# Patient Record
Sex: FEMALE | Race: WHITE | NOT HISPANIC OR LATINO | Employment: OTHER | URBAN - METROPOLITAN AREA
[De-identification: names, ages, dates, MRNs, and addresses within clinical notes are randomized per-mention and may not be internally consistent; named-entity substitution may affect disease eponyms.]

---

## 2017-04-26 ENCOUNTER — TELEPHONE (OUTPATIENT)
Dept: FAMILY MEDICINE | Facility: CLINIC | Age: 62
End: 2017-04-26

## 2017-04-26 NOTE — TELEPHONE ENCOUNTER
Patient would like a letter to be excused from Jury Duty due to Restless leg Syndrome.  Due for Jury Duty June 1st.    Please advise

## 2017-04-26 NOTE — TELEPHONE ENCOUNTER
----- Message from Mitali Boyd sent at 4/25/2017  4:12 PM CDT -----  Contact: Julio (spouse)  Julio (spouse) calling in regards to requesting a Doctor's note for Jury Duty due to the patient not being able to sit for long periods of time. Please advise.  Call back .  Thanks!

## 2017-04-26 NOTE — LETTER
[facility]  [address]  [place] [address]  Phone: [phone]  Fax: [phone] April 28, 2017    Jennifer Hurst  [address] [place] [address]      To Whom It May Concern:    Jennifer Hurst is unable to participate in jury duty due to the fact that she has restless leg syndrome and it is difficult for her to sit in one spot for any extended period of time.  She does require being able to get up and move around periodically for this condition.    If you have any questions or concerns, please feel free to call my office.    Sincerely,        Govind Feng Jr., MD

## 2017-04-29 ENCOUNTER — TELEPHONE (OUTPATIENT)
Dept: FAMILY MEDICINE | Facility: CLINIC | Age: 62
End: 2017-04-29

## 2017-04-29 NOTE — TELEPHONE ENCOUNTER
----- Message from Alexandra Dietrich RT sent at 4/29/2017  9:12 AM CDT -----  Contact: Julio () [phone]  Julio () [phone], requesting to check the status of a letter in [facility] jury duty for the pt, please call soon, thanks.

## 2017-11-07 ENCOUNTER — DOCUMENTATION ONLY (OUTPATIENT)
Dept: FAMILY MEDICINE | Facility: CLINIC | Age: 62
End: 2017-11-07

## 2017-11-07 DIAGNOSIS — Z11.59 NEED FOR HEPATITIS C SCREENING TEST: Primary | ICD-10-CM

## 2017-11-07 DIAGNOSIS — Z12.39 SCREENING FOR BREAST CANCER: ICD-10-CM

## 2017-11-07 NOTE — PROGRESS NOTES
Pre-Visit Chart Review  For Appointment Scheduled on (date) 11/9/17    Health Maintenance Due   Topic Date Due    Hepatitis C Screening  1955    Lipid Panel  1955    TETANUS VACCINE  02/03/1971    Zoster Vaccine  02/03/2013    Influenza Vaccine  08/01/2017    Mammogram  10/16/2017

## 2017-11-09 ENCOUNTER — OFFICE VISIT (OUTPATIENT)
Dept: FAMILY MEDICINE | Facility: CLINIC | Age: 62
End: 2017-11-09

## 2017-11-09 VITALS
TEMPERATURE: 98 F | DIASTOLIC BLOOD PRESSURE: 62 MMHG | WEIGHT: 121.69 LBS | RESPIRATION RATE: 16 BRPM | HEART RATE: 77 BPM | HEIGHT: 64 IN | SYSTOLIC BLOOD PRESSURE: 89 MMHG | BODY MASS INDEX: 20.78 KG/M2

## 2017-11-09 DIAGNOSIS — R53.83 OTHER FATIGUE: Primary | ICD-10-CM

## 2017-11-09 PROCEDURE — 99213 OFFICE O/P EST LOW 20 MIN: CPT | Mod: S$PBB,,, | Performed by: FAMILY MEDICINE

## 2017-11-09 PROCEDURE — 99999 PR PBB SHADOW E&M-EST. PATIENT-LVL III: CPT | Mod: PBBFAC,,, | Performed by: FAMILY MEDICINE

## 2017-11-09 PROCEDURE — 99213 OFFICE O/P EST LOW 20 MIN: CPT | Mod: PBBFAC,PO | Performed by: FAMILY MEDICINE

## 2017-11-09 RX ORDER — VIT C/E/ZN/COPPR/LUTEIN/ZEAXAN 250MG-90MG
1000 CAPSULE ORAL DAILY
Refills: 0 | COMMUNITY
Start: 2017-11-09

## 2017-11-09 RX ORDER — FISH OIL/DHA/EPA 1200-144MG
1 CAPSULE ORAL DAILY
COMMUNITY

## 2017-11-13 NOTE — PROGRESS NOTES
Subjective:       Patient ID: Jennifer Hurst is a 62 y.o. female.    Chief Complaint: Fatigue    Patient presents here for complaint of fatigue.  She states that overall she feels well but does like she fatigues a little leisure than she used to.  It has been about 3-4 years since I have seen her.  She is still very active and denies any shortness of breath, chest pain, palpitations, epigastric pain, or nausea or vomiting.  She states she just feels more fatigued at the end of the day.  She is on no prescription medications and only takes over-the-counter vitamins as well as fish oil.  She does eat a healthy diet and her weight is stable.  She also does exercise on a regular basis.  She has no chronic medical problems.  As far as screening, she declines flu vaccine and shingles vaccine.  She has had her Pap smear and mammogram by her gynecologist last month.  She also is due for her lab work at this time.      Review of Systems   Constitutional: Positive for fatigue (mild). Negative for chills, fever and unexpected weight change.   HENT: Negative for congestion, ear pain, postnasal drip and sore throat.    Respiratory: Negative for cough and shortness of breath.    Cardiovascular: Negative for chest pain and palpitations.   Gastrointestinal: Negative for abdominal pain, constipation, diarrhea, nausea and vomiting.   Genitourinary: Negative for difficulty urinating, dysuria, flank pain and pelvic pain.   Musculoskeletal: Negative for arthralgias, back pain and myalgias.   Neurological: Negative for dizziness, light-headedness and headaches.   Psychiatric/Behavioral: Negative for sleep disturbance. The patient is not nervous/anxious.        Objective:      Physical Exam   Constitutional: She is oriented to person, place, and time. She appears well-developed and well-nourished.   HENT:   Head: Normocephalic and atraumatic.   Right Ear: External ear normal.   Left Ear: External ear normal.   Nose: Nose normal.    Mouth/Throat: Oropharynx is clear and moist.   Neck: Normal range of motion. Neck supple. No thyromegaly present.   Cardiovascular: Normal rate, regular rhythm, normal heart sounds and intact distal pulses.    No murmur heard.  Pulmonary/Chest: Effort normal and breath sounds normal. She has no wheezes. She has no rales.   Abdominal: Soft. Bowel sounds are normal. She exhibits no mass. There is no tenderness. There is no rebound.   Musculoskeletal: Normal range of motion. She exhibits no edema or tenderness.   Lymphadenopathy:     She has no cervical adenopathy.   Neurological: She is alert and oriented to person, place, and time. She has normal reflexes. No cranial nerve deficit.   Skin: Skin is warm and dry. No rash noted.   Psychiatric: She has a normal mood and affect. Her behavior is normal.   Vitals reviewed.      Assessment:       1. Other fatigue        Plan:       1.  CMP, lipid profile, TSH  2.  Continue healthy diet and exercise  3.  Follow-up with me in one year or when necessary

## 2017-11-20 ENCOUNTER — TELEPHONE (OUTPATIENT)
Dept: FAMILY MEDICINE | Facility: CLINIC | Age: 62
End: 2017-11-20

## 2017-11-20 NOTE — TELEPHONE ENCOUNTER
----- Message from Isabel Child sent at 11/20/2017  2:40 PM CST -----  Contact: Patient  Patient called inquiring about her lab results. Patient advised they were completed over a week ago at [facility] and has not heard back regarding the results.  Please call patient back at [phone].  Thank you!

## 2017-11-20 NOTE — TELEPHONE ENCOUNTER
[facility]  Results are on your desk for review.

## 2017-12-04 ENCOUNTER — TELEPHONE (OUTPATIENT)
Dept: FAMILY MEDICINE | Facility: CLINIC | Age: 62
End: 2017-12-04

## 2017-12-04 NOTE — TELEPHONE ENCOUNTER
----- Message from Kim Costa sent at 12/4/2017  3:00 PM CST -----  Contact: SELF  Patient is requesting to speak with Dr Feng to discuss about her cholesterol being high. Please call the patient at [phone].

## 2017-12-07 ENCOUNTER — TELEPHONE (OUTPATIENT)
Dept: FAMILY MEDICINE | Facility: CLINIC | Age: 62
End: 2017-12-07

## 2017-12-07 NOTE — TELEPHONE ENCOUNTER
----- Message from Lyndsey Ames sent at 12/7/2017  1:20 PM CST -----  Pt had labs at lab at [facility] 11/09 ... Asking to  copy of results  report today ... Call pt at [phone]

## 2017-12-28 ENCOUNTER — HOSPITAL ENCOUNTER (EMERGENCY)
Facility: HOSPITAL | Age: 62
Discharge: HOME | End: 2017-12-28
Attending: EMERGENCY MEDICINE

## 2017-12-28 VITALS
TEMPERATURE: 99 F | HEART RATE: 86 BPM | DIASTOLIC BLOOD PRESSURE: 55 MMHG | RESPIRATION RATE: 18 BRPM | HEIGHT: 64 IN | OXYGEN SATURATION: 98 % | WEIGHT: 121 LBS | SYSTOLIC BLOOD PRESSURE: 113 MMHG | BODY MASS INDEX: 20.66 KG/M2

## 2017-12-28 DIAGNOSIS — J10.1 INFLUENZA A: ICD-10-CM

## 2017-12-28 DIAGNOSIS — R55 SYNCOPE: ICD-10-CM

## 2017-12-28 DIAGNOSIS — R55 SYNCOPE, UNSPECIFIED SYNCOPE TYPE: ICD-10-CM

## 2017-12-28 DIAGNOSIS — S01.01XA LACERATION OF SCALP, INITIAL ENCOUNTER: Primary | ICD-10-CM

## 2017-12-28 LAB
ALBUMIN SERPL BCP-MCNC: 3.7 G/DL
ALP SERPL-CCNC: 66 U/L
ALT SERPL W/O P-5'-P-CCNC: 18 U/L
ANION GAP SERPL CALC-SCNC: 11 MMOL/L
AST SERPL-CCNC: 18 U/L
BASOPHILS # BLD AUTO: 0 K/UL
BASOPHILS NFR BLD: 0.1 %
BILIRUB SERPL-MCNC: 0.4 MG/DL
BUN SERPL-MCNC: 13 MG/DL
CALCIUM SERPL-MCNC: 9 MG/DL
CHLORIDE SERPL-SCNC: 102 MMOL/L
CO2 SERPL-SCNC: 26 MMOL/L
CREAT SERPL-MCNC: 0.9 MG/DL
DIFFERENTIAL METHOD: ABNORMAL
EOSINOPHIL # BLD AUTO: 0.1 K/UL
EOSINOPHIL NFR BLD: 2.1 %
ERYTHROCYTE [DISTWIDTH] IN BLOOD BY AUTOMATED COUNT: 13.1 %
EST. GFR  (AFRICAN AMERICAN): >60 ML/MIN/1.73 M^2
EST. GFR  (NON AFRICAN AMERICAN): >60 ML/MIN/1.73 M^2
FLUAV AG SPEC QL IA: POSITIVE
FLUBV AG SPEC QL IA: NEGATIVE
GLUCOSE SERPL-MCNC: 107 MG/DL
HCT VFR BLD AUTO: 39.7 %
HGB BLD-MCNC: 13.4 G/DL
LYMPHOCYTES # BLD AUTO: 0.5 K/UL
LYMPHOCYTES NFR BLD: 8.5 %
MCH RBC QN AUTO: 30.7 PG
MCHC RBC AUTO-ENTMCNC: 33.8 G/DL
MCV RBC AUTO: 91 FL
MONOCYTES # BLD AUTO: 0.2 K/UL
MONOCYTES NFR BLD: 4.5 %
NEUTROPHILS # BLD AUTO: 4.7 K/UL
NEUTROPHILS NFR BLD: 84.8 %
PLATELET # BLD AUTO: 164 K/UL
PMV BLD AUTO: 8.6 FL
POTASSIUM SERPL-SCNC: 4 MMOL/L
PROT SERPL-MCNC: 6.9 G/DL
RBC # BLD AUTO: 4.36 M/UL
SODIUM SERPL-SCNC: 139 MMOL/L
SPECIMEN SOURCE: ABNORMAL
WBC # BLD AUTO: 5.5 K/UL

## 2017-12-28 PROCEDURE — 90715 TDAP VACCINE 7 YRS/> IM: CPT | Performed by: EMERGENCY MEDICINE

## 2017-12-28 PROCEDURE — 85025 COMPLETE CBC W/AUTO DIFF WBC: CPT

## 2017-12-28 PROCEDURE — 93005 ELECTROCARDIOGRAM TRACING: CPT

## 2017-12-28 PROCEDURE — 12002 RPR S/N/AX/GEN/TRNK2.6-7.5CM: CPT

## 2017-12-28 PROCEDURE — 80053 COMPREHEN METABOLIC PANEL: CPT

## 2017-12-28 PROCEDURE — 36415 COLL VENOUS BLD VENIPUNCTURE: CPT

## 2017-12-28 PROCEDURE — 90471 IMMUNIZATION ADMIN: CPT | Performed by: EMERGENCY MEDICINE

## 2017-12-28 PROCEDURE — 87400 INFLUENZA A/B EACH AG IA: CPT | Mod: 59

## 2017-12-28 PROCEDURE — 25000003 PHARM REV CODE 250: Performed by: EMERGENCY MEDICINE

## 2017-12-28 PROCEDURE — 96360 HYDRATION IV INFUSION INIT: CPT

## 2017-12-28 PROCEDURE — 63600175 PHARM REV CODE 636 W HCPCS: Performed by: EMERGENCY MEDICINE

## 2017-12-28 PROCEDURE — 99284 EMERGENCY DEPT VISIT MOD MDM: CPT | Mod: 25

## 2017-12-28 RX ORDER — LIDOCAINE HYDROCHLORIDE 10 MG/ML
10 INJECTION INFILTRATION; PERINEURAL
Status: DISCONTINUED | OUTPATIENT
Start: 2017-12-28 | End: 2017-12-28

## 2017-12-28 RX ORDER — OSELTAMIVIR PHOSPHATE 75 MG/1
75 CAPSULE ORAL 2 TIMES DAILY
Qty: 10 CAPSULE | Refills: 0 | Status: SHIPPED | OUTPATIENT
Start: 2017-12-28 | End: 2017-12-28

## 2017-12-28 RX ORDER — LIDOCAINE HYDROCHLORIDE 10 MG/ML
10 INJECTION, SOLUTION EPIDURAL; INFILTRATION; INTRACAUDAL; PERINEURAL
Status: COMPLETED | OUTPATIENT
Start: 2017-12-28 | End: 2017-12-28

## 2017-12-28 RX ORDER — OSELTAMIVIR PHOSPHATE 75 MG/1
75 CAPSULE ORAL 2 TIMES DAILY
Qty: 10 CAPSULE | Refills: 0 | Status: SHIPPED | OUTPATIENT
Start: 2017-12-28 | End: 2018-01-02

## 2017-12-28 RX ADMIN — LIDOCAINE HYDROCHLORIDE 100 MG: 10 INJECTION, SOLUTION EPIDURAL; INFILTRATION; INTRACAUDAL; PERINEURAL at 12:12

## 2017-12-28 RX ADMIN — CLOSTRIDIUM TETANI TOXOID ANTIGEN (FORMALDEHYDE INACTIVATED), CORYNEBACTERIUM DIPHTHERIAE TOXOID ANTIGEN (FORMALDEHYDE INACTIVATED), BORDETELLA PERTUSSIS TOXOID ANTIGEN (GLUTARALDEHYDE INACTIVATED), BORDETELLA PERTUSSIS FILAMENTOUS HEMAGGLUTININ ANTIGEN (FORMALDEHYDE INACTIVATED), BORDETELLA PERTUSSIS PERTACTIN ANTIGEN, AND BORDETELLA PERTUSSIS FIMBRIAE 2/3 ANTIGEN 0.5 ML: 5; 2; 2.5; 5; 3; 5 INJECTION, SUSPENSION INTRAMUSCULAR at 12:12

## 2017-12-28 RX ADMIN — SODIUM CHLORIDE 1000 ML: 0.9 INJECTION, SOLUTION INTRAVENOUS at 11:12

## 2017-12-28 NOTE — ED PROVIDER NOTES
Encounter Date: 12/28/2017    SCRIBE #1 NOTE: I, Sherry Sierra, am scribing for, and in the presence of, Dr. Ferraro.       History     Chief Complaint   Patient presents with    Loss of Consciousness    Head Injury     laceration     Fatigue     recent flu symptoms        12/28/2017 10:33 AM     Chief complaint: Loss of consciousness      Jennifer Hurst is a 62 y.o. female who presents to the ED with an onset of syncope and scalp lacerations.  She reports several days of URI and myalgias.  Today She reports feeling dizzy and then passed out in the kitchen.  She does not remember passing out but does note that she felt dizzy prior to fainting.  During the fall she hit her head on a rack.  The patient reports endorsing flu-like symptoms including chills, bilateral ear pain, body aches, and  dizzy for the last 4 days. She reports having ear pain accompanied with her seasonal allergies.  No double vision no difficulty speaking or swallowing.  Her tetanus is not UTD. The patient denies chest pain, nausea, vomiting, diarrhea, dysuria, urinary frequency, or any other symptoms at this time. No PMHx or SHx noted.       The history is provided by the patient and the spouse ().     Review of patient's allergies indicates:  No Known Allergies  No past medical history on file.  History reviewed. No pertinent surgical history.  Family History   Problem Relation Age of Onset    Heart disease Mother     Cancer Sister      kidney cancer     Social History   Substance Use Topics    Smoking status: Never Smoker    Smokeless tobacco: Never Used    Alcohol use No     Review of Systems   Constitutional: Positive for chills. Negative for fever.   HENT: Positive for ear pain (bilateral) and rhinorrhea. Negative for congestion, sneezing and sore throat.         Positive for head injury.    Eyes: Negative for visual disturbance.   Respiratory: Negative for cough and shortness of breath.    Cardiovascular: Negative for chest  pain and palpitations.   Gastrointestinal: Negative for abdominal pain, diarrhea, nausea and vomiting.   Genitourinary: Negative for dysuria, frequency and hematuria.   Musculoskeletal: Positive for myalgias (body aches). Negative for back pain and neck pain.   Skin: Positive for wound (right parietal laceration). Negative for rash.   Neurological: Positive for dizziness. Negative for seizures and headaches.        Positive for loss of consciousness.    All other systems reviewed and are negative.    Physical Exam     Initial Vitals [12/28/17 1014]   BP Pulse Resp Temp SpO2   (!) 113/55 86 18 98.8 °F (37.1 °C) 98 %      MAP       74.33         Physical Exam    Nursing note and vitals reviewed.  Constitutional: She appears well-developed and well-nourished. She is not diaphoretic.  Non-toxic appearance. She does not have a sickly appearance. She does not appear ill. No distress.   HENT:   Head: Normocephalic. Head is with laceration.       Matted blood to the right parietal region.    Eyes: EOM are normal.   Neck: Normal range of motion. Neck supple.   Cardiovascular: Normal rate, regular rhythm and normal heart sounds. Exam reveals no gallop and no friction rub.    No murmur heard.  Pulmonary/Chest: Breath sounds normal. No respiratory distress. She has no wheezes. She has no rhonchi. She has no rales.   Abdominal: Soft. She exhibits no distension. There is no tenderness. There is no rebound and no guarding.   Musculoskeletal: Normal range of motion.   Neurological: She is alert and oriented to person, place, and time.   Skin: Skin is warm and dry.   Psychiatric: She has a normal mood and affect. Her behavior is normal. Judgment and thought content normal.       ED Course   Lac Repair  Date/Time: 12/28/2017 12:05 PM  Performed by: ADAN ERAZO.  Authorized by: ADAN ERAZO   Consent Done: Not Needed  Body area: head/neck  Location details: scalp  Laceration length: 5 cm  Foreign bodies: no foreign  bodies  Tendon involvement: none  Nerve involvement: none  Vascular damage: no  Anesthesia: local infiltration    Anesthesia:  Local Anesthetic: lidocaine 1% without epinephrine  Anesthetic total: 5 mL  Patient sedated: no  Preparation: Patient was prepped and draped in the usual sterile fashion.  Irrigation solution: saline  Irrigation method: jet lavage  Amount of cleaning: standard  Debridement: none  Degree of undermining: none  Skin closure: staples  Technique: simple  Approximation: close  Approximation difficulty: simple        Labs Reviewed   CBC W/ AUTO DIFFERENTIAL - Abnormal; Notable for the following:        Result Value    MPV 8.6 (*)     Lymph # 0.5 (*)     Mono # 0.2 (*)     Gran% 84.8 (*)     Lymph% 8.5 (*)     All other components within normal limits   INFLUENZA A AND B ANTIGEN - Abnormal; Notable for the following:     Influenza A Ag, EIA Positive (*)     All other components within normal limits   COMPREHENSIVE METABOLIC PANEL      Imaging Results          CT Head Without Contrast (Final result)  Result time 12/28/17 11:12:25    Final result by David Villagran MD (12/28/17 11:12:25)                 Impression:      1.  No acute intracranial abnormalities seen.  Soft tissue laceration involving the right parietal scalp without underlying calvarial fracture.      Electronically signed by: David Villagran MD  Date:     12/28/17  Time:    11:12              Narrative:    Comparison: None available    Technique: Axial 5-mm images are reviewed from the skull base to the vertex without contrast. Coronal and sagittal reconstructions are reviewed.    Findings: No acute intracranial hemorrhage.  No extra-axial fluid collection. No low attenuation changes to suggest acute infarct. No mass-effect or midline shift.  The ventricles and basal cisterns are within normal limits in size and configuration.      The bony calvarium is intact.  There is fluid within the left maxillary sinus and right ethmoid  sinuses consistent with sinusitis.    There is a soft tissue laceration involving the right parietal scalp with subcutaneous gas noted. No radiopaque foreign body is identified.                             X-Ray Chest AP Portable (Final result)  Result time 12/28/17 10:49:26    Final result by Hai Lee Jr., MD (12/28/17 10:49:26)                 Impression:     Negative portable chest.      Electronically signed by: Hai Lee MD  Date:     12/28/17  Time:    10:49              Narrative:    Single view portable chest is obtained.    The mediastinal and cardiac size and contours are normal.  No intrapulmonary masses or infiltrates are seen. No pneumothorax is identified.                                  Medical Decision Making:   History:   Old Medical Records: I decided to obtain old medical records.  Clinical Tests:   Lab Tests: Reviewed and Ordered  Radiological Study: Ordered and Reviewed  Medical Tests: Reviewed and Ordered                   ED Course as of Dec 28 1318   Thu Dec 28, 2017   1209 Influenza A positive.  Sinus rhythm with short  which was 114 a few years ago.  This is not the source of her syncope.  Laceration was stapled.  Follow-up one week primary care for staple removal.  Started on Tamiflu.  Tetanus updated.  [EF]   1210 Influenza A Ag, EIA: (!) Positive [EF]      ED Course User Index  [EF] Ari Ferraro MD     Clinical Impression:     1. Laceration of scalp, initial encounter    2. Syncope    3. Influenza A    4. Syncope, unspecified syncope type          Disposition:   Disposition: Discharged  Condition: Stable       62-year-old female with reports of 4 days of runny nose cough congestion myalgias.  Felt dizzy today on awakening on walking to the kitchen she passed out.  No cerebellar findings in the emergency room to suggest cerebellar pathology such as a bleed or tumor or stroke.  Influenza A is positive.  CT of the head is unremarkable for any skull fracture or  bleeding.  EKG labs chest x-ray unremarkable for any acute findings.  Laceration closed with staples, tetanus updated she will be discharged from the ER on Tamiflu follow-up primary care 1 week for staple removal.                 Ari Ferraro MD  12/28/17 [id number]

## 2017-12-29 ENCOUNTER — TELEPHONE (OUTPATIENT)
Dept: FAMILY MEDICINE | Facility: CLINIC | Age: 62
End: 2017-12-29

## 2017-12-29 NOTE — TELEPHONE ENCOUNTER
----- Message from Toyin Samuel LPN sent at 12/29/2017 12:55 PM CST -----  Contact: patient      ----- Message -----  From: Polly Craig  Sent: 12/29/2017  12:43 PM  To: Monica Patel Staff    Patient is calling to make a nurses' visit appointment to get the staples taken out of her head for next Thursday, 01/04/2018..  Can to please call her back at [phone].  Thank you

## 2018-01-05 ENCOUNTER — OFFICE VISIT (OUTPATIENT)
Dept: FAMILY MEDICINE | Facility: CLINIC | Age: 63
End: 2018-01-05

## 2018-01-05 VITALS
HEART RATE: 74 BPM | HEIGHT: 64 IN | TEMPERATURE: 99 F | SYSTOLIC BLOOD PRESSURE: 85 MMHG | DIASTOLIC BLOOD PRESSURE: 54 MMHG | BODY MASS INDEX: 20.67 KG/M2 | WEIGHT: 121.06 LBS

## 2018-01-05 DIAGNOSIS — Z48.02 ENCOUNTER FOR STAPLE REMOVAL: Primary | ICD-10-CM

## 2018-01-05 PROCEDURE — 99213 OFFICE O/P EST LOW 20 MIN: CPT | Mod: S$PBB,,, | Performed by: NURSE PRACTITIONER

## 2018-01-05 PROCEDURE — 99999 PR PBB SHADOW E&M-EST. PATIENT-LVL III: CPT | Mod: PBBFAC,,, | Performed by: NURSE PRACTITIONER

## 2018-01-05 PROCEDURE — 99213 OFFICE O/P EST LOW 20 MIN: CPT | Mod: PBBFAC,PO | Performed by: NURSE PRACTITIONER

## 2018-01-05 NOTE — PROGRESS NOTES
Subjective:       Patient ID: Jennifer Hurst is a 62 y.o. female.    Chief Complaint: Suture / Staple Removal (4 staples left back side of head)    Suture / Staple Removal   The sutures were placed 7 to 10 days ago. She tried regular soap and water washings since the wound repair. The treatment provided significant relief. Her temperature was unmeasured prior to arrival. There has been bloody (first day--now resolved.) discharge from the wound. There is no redness present. There is no swelling present. There is no pain present. She has no difficulty moving the affected extremity or digit.     Review of Systems   Constitutional: Negative for chills and fever.   HENT: Positive for postnasal drip and sore throat.    Respiratory: Negative for cough and shortness of breath.    Skin: Positive for wound.       Objective:      Physical Exam   Constitutional: She is oriented to person, place, and time. She appears well-developed and well-nourished. No distress.   HENT:   Head: Normocephalic and atraumatic.   Right Ear: External ear normal.   Left Ear: External ear normal.   Eyes: Conjunctivae and EOM are normal.   Cardiovascular: Normal rate and regular rhythm.    Pulmonary/Chest: Effort normal.   Musculoskeletal: Normal range of motion.   Neurological: She is alert and oriented to person, place, and time.   Skin: Skin is warm and dry. Laceration (scalp.  scab without active bleeding.  Four staples intact.) noted.   Psychiatric: She has a normal mood and affect. Her behavior is normal.   Vitals reviewed.          Current Outpatient Prescriptions:     cholecalciferol, vitamin D3, 1,000 unit capsule, Take 1 capsule (1,000 Units total) by mouth once daily., Disp: , Rfl: 0    fish oil-dha-epa 1,200-144-216 mg Cap, Take 1 capsule by mouth once daily., Disp: , Rfl:     Lactobacillus rhamnosus GG (CULTURELLE) 10 billion cell capsule, Take 1 capsule by mouth once daily., Disp: , Rfl:   Assessment:       1. Encounter for staple  removal        Plan:       Encounter for staple removal  Four staples removed without bleeding.  Wash hair with baby shampoo.  Do not pick scab; allow to fall off naturally.

## 2019-01-10 DIAGNOSIS — Z11.59 NEED FOR HEPATITIS C SCREENING TEST: ICD-10-CM

## 2019-01-10 DIAGNOSIS — Z12.39 BREAST CANCER SCREENING: ICD-10-CM

## 2019-02-15 ENCOUNTER — TELEPHONE (OUTPATIENT)
Dept: FAMILY MEDICINE | Facility: CLINIC | Age: 64
End: 2019-02-15

## 2019-02-15 NOTE — TELEPHONE ENCOUNTER
----- Message from Alexandra Dietrich RT sent at 2/15/2019  9:06 AM CST -----  Contact: pt    pt , requesting an appt to be worked in today for having issues with sinus / allergy, thanks.

## 2019-02-15 NOTE — TELEPHONE ENCOUNTER
Pt c/o cough and congestion. Offered pt soonest availability on 02/18/19, pt accepted. Advised pt if symptoms worsened over the weekend, to go to UC or ER. Understanding verbalized.

## 2019-09-24 ENCOUNTER — OFFICE VISIT (OUTPATIENT)
Dept: FAMILY MEDICINE | Facility: CLINIC | Age: 64
End: 2019-09-24
Payer: MEDICARE

## 2019-09-24 ENCOUNTER — LAB VISIT (OUTPATIENT)
Dept: LAB | Facility: HOSPITAL | Age: 64
End: 2019-09-24
Attending: NURSE PRACTITIONER
Payer: MEDICARE

## 2019-09-24 VITALS
HEIGHT: 64 IN | SYSTOLIC BLOOD PRESSURE: 118 MMHG | DIASTOLIC BLOOD PRESSURE: 60 MMHG | TEMPERATURE: 98 F | RESPIRATION RATE: 17 BRPM | OXYGEN SATURATION: 96 % | BODY MASS INDEX: 24.65 KG/M2 | WEIGHT: 144.38 LBS | HEART RATE: 76 BPM

## 2019-09-24 DIAGNOSIS — Z13.220 NEED FOR LIPID SCREENING: ICD-10-CM

## 2019-09-24 DIAGNOSIS — Z00.00 WELLNESS EXAMINATION: ICD-10-CM

## 2019-09-24 DIAGNOSIS — Z13.6 ENCOUNTER FOR SCREENING FOR CARDIOVASCULAR DISORDERS: ICD-10-CM

## 2019-09-24 DIAGNOSIS — Z82.3 FAMILY HISTORY OF CVA: ICD-10-CM

## 2019-09-24 DIAGNOSIS — Z00.00 WELLNESS EXAMINATION: Primary | ICD-10-CM

## 2019-09-24 PROCEDURE — 99397 PR PREVENTIVE VISIT,EST,65 & OVER: ICD-10-PCS | Mod: HCNC,S$GLB,, | Performed by: NURSE PRACTITIONER

## 2019-09-24 PROCEDURE — 99397 PER PM REEVAL EST PAT 65+ YR: CPT | Mod: HCNC,S$GLB,, | Performed by: NURSE PRACTITIONER

## 2019-09-24 PROCEDURE — 80053 COMPREHEN METABOLIC PANEL: CPT | Mod: HCNC

## 2019-09-24 PROCEDURE — 99999 PR PBB SHADOW E&M-EST. PATIENT-LVL IV: CPT | Mod: PBBFAC,HCNC,, | Performed by: NURSE PRACTITIONER

## 2019-09-24 PROCEDURE — 36415 COLL VENOUS BLD VENIPUNCTURE: CPT | Mod: HCNC,PO

## 2019-09-24 PROCEDURE — 80061 LIPID PANEL: CPT | Mod: HCNC

## 2019-09-24 PROCEDURE — 99999 PR PBB SHADOW E&M-EST. PATIENT-LVL IV: ICD-10-PCS | Mod: PBBFAC,HCNC,, | Performed by: NURSE PRACTITIONER

## 2019-09-24 NOTE — PATIENT INSTRUCTIONS
Prevention Guidelines, Women Ages 65 and Older  Screening tests and vaccines are an important part of managing your health. Health counseling is essential, too. Below are guidelines for these, for women ages 65 and older. Talk with your healthcare provider to make sure youre up to date on what you need.  Screening Who needs it How often   Type 2 diabetes or prediabetes All adults beginning at age 45 and adults without symptoms at any age who are overweight or obese and have 1 or more additional risk factors for diabetes At least every 3 years   Alcohol misuse All women in this age group At routine exams   Blood pressure All women in this age group Every 2 years if your blood pressure is less than 120/80 mm Hg; yearly if your systolic blood pressure is 120 to 139 mm Hg, or your diastolic blood pressure reading is 80 to 89 mm Hg   Breast cancer All women in this age group Yearly mammogram and clinical breast exam1   Cervical cancer Only women who had abnormal screening results before age 65 Talk with your healthcare provider   Chlamydia Women at increased risk for infection At routine exams   Colorectal cancer All women in this age group1 Flexible sigmoidoscopy every 5 years, or colonoscopy every 10 years, or double-contrast barium enema every 5 years; yearly fecal occult blood test or fecal immunochemical test; or a stool DNA test as often as your healthcare provider advises; talk with your healthcare provider about which tests are best for you   Depression All women in this age group At routine exams   Gonorrhea Sexually active women at increased risk for infection At routine exams   Hepatitis C Anyone at increased risk; 1 time for those born between 1945 and 1965 At routine exams   High cholesterol or triglycerides All women in this age group who are at risk for coronary artery disease At least every 5 years   HIV Women at increased risk for infection - talk with your healthcare provider At routine exams   Lung  cancer Adults age 55 to 80 who have smoked Yearly screening in smokers with 30 pack-year history of smoking or who quit within 15 years   Obesity All women in this age group At routine exams   Osteoporosis All women in this age group Bone density test at age 65, then follow-up as advised by your healthcare provider   Syphilis Women at increased risk for infection - talk with your healthcare provider At routine exams   Thyroid-Stimulating Hormone (TSH) All women in this age group Every 5 years   Tuberculosis Women at increased risk for infection - talk with your healthcare provider Ask your healthcare provider   Vision All women in this age group Every 1 to 2 years; if you have a chronic health condition, ask your healthcare provider if you need exams more often   Vaccine Who needs it How often   Chickenpox (varicella) All women in this age group who have no record of this infection or vaccine 2 doses; second dose should be given at least 4 weeks after the first dose   Hepatitis A Women at increased risk for infection - talk with your healthcare provider 2 doses given 6 months apart   Hepatitis B Women at increased risk for infection - talk with your healthcare provider 3 doses over 6 months; second dose should be given 1 month after the first dose; the third dose should be given at least 2 months after the second dose and at least 4 months after the first dose   Haemophilus influenza Type B (HIB) Women at increased risk for infection - talk with your healthcare provider 1 to 3 doses   Influenza (flu) All women in this age group Once a year   Pneumococcal conjugate vaccine (PCV13) and pneumococcal polysaccharide vaccine (PPSV23) All women in this age group 1 dose of each vaccine   Tetanus/diphtheria/pertussis (Td/Tdap) booster All women in this age group Td every 10 years, or a one-time dose of Tdap instead of a Td booster after age 18, then Td every 10 years   Zoster All women in this age group 1 dose   Counseling  Who needs it How often   Diet and exercise Women who are overweight or obese When diagnosed, and then at routine exams   Fall prevention (exercise and vitamin D supplements) All women in this age group At routine exams   Sexually transmitted infection prevention Women at increased risk for infection - talk with your healthcare provider At routine exams   Use of daily aspirin Women ages 55 and up in this age group who are at risk for cardiovascular health problems such as stroke When your risk is known   Use of tobacco and the health effects it can cause All women in this age group Every exam   1[employer]  Date Last Reviewed: 8/9/2015  © 4399-9397 [employer]. [address] [place] [address], [place] [address]. All rights reserved. This information is not intended as a substitute for professional medical care. Always follow your healthcare professional's instructions.

## 2019-09-24 NOTE — PROGRESS NOTES
Subjective:       Patient ID: Jennifer Hurst is a 64 y.o. female.    Chief Complaint: Annual Exam (wellness)    Patient who is new to me presents for a wellness exam. She needs her labs done. She has done well with her diet and exercise. She is up-to-date with her mammogram, and DEXA scan.  Her last colonoscopy was done a few years ago and it was completely normal. She has to follow up in 2024. She states she needs to do more research for the influenza and pneumococcal vaccines.     Review of Systems   Constitutional: Negative for chills, fatigue and fever.   HENT: Negative for congestion, sinus pressure, sinus pain, sneezing and sore throat.    Respiratory: Negative for cough, chest tightness, shortness of breath and wheezing.    Cardiovascular: Negative for chest pain, palpitations and leg swelling.   Gastrointestinal: Negative for abdominal distention, abdominal pain, constipation, diarrhea, nausea and vomiting.   Genitourinary: Negative for decreased urine volume, difficulty urinating, dysuria, frequency and urgency.   Musculoskeletal: Negative for arthralgias, gait problem, joint swelling and myalgias.   Skin: Negative for rash and wound.   Neurological: Negative for dizziness, light-headedness, numbness and headaches.       Objective:      Physical Exam   Constitutional: She is oriented to person, place, and time. She appears well-developed and well-nourished.   HENT:   Head: Normocephalic and atraumatic.   Right Ear: External ear normal.   Left Ear: External ear normal.   Nose: Nose normal.   Mouth/Throat: Oropharynx is clear and moist.   Eyes: Pupils are equal, round, and reactive to light.   Neck: Normal range of motion.   Cardiovascular: Normal rate, regular rhythm, normal heart sounds and intact distal pulses.   Pulmonary/Chest: Effort normal and breath sounds normal.   Abdominal: Soft. Bowel sounds are normal.   Musculoskeletal: Normal range of motion.   Neurological: She is alert and oriented to person,  place, and time.   Skin: Skin is warm and dry.   Nursing note and vitals reviewed.      Assessment:       1. Wellness examination    2. Encounter for screening for cardiovascular disorders         Plan:       Jennifer was seen today for annual exam.    Diagnoses and all orders for this visit:    Wellness examination  -     Lipid panel; Future  -     Comprehensive metabolic panel; Future    Encounter for screening for cardiovascular disorders   -     Lipid panel; Future  -     Comprehensive metabolic panel; Future    Discussed healthy diet, regular exercise, necessary labs, age appropriate cancer screening, and routine vaccinations.

## 2019-09-25 LAB
ALBUMIN SERPL BCP-MCNC: 4.1 G/DL (ref 3.5–5.2)
ALP SERPL-CCNC: 67 U/L (ref 55–135)
ALT SERPL W/O P-5'-P-CCNC: 13 U/L (ref 10–44)
ANION GAP SERPL CALC-SCNC: 10 MMOL/L (ref 8–16)
AST SERPL-CCNC: 18 U/L (ref 10–40)
BILIRUB SERPL-MCNC: 0.7 MG/DL (ref 0.1–1)
BUN SERPL-MCNC: 17 MG/DL (ref 8–23)
CALCIUM SERPL-MCNC: 9.5 MG/DL (ref 8.7–10.5)
CHLORIDE SERPL-SCNC: 105 MMOL/L (ref 95–110)
CHOLEST SERPL-MCNC: 195 MG/DL (ref 120–199)
CHOLEST/HDLC SERPL: 2.6 {RATIO} (ref 2–5)
CO2 SERPL-SCNC: 26 MMOL/L (ref 23–29)
CREAT SERPL-MCNC: 0.8 MG/DL (ref 0.5–1.4)
EST. GFR  (AFRICAN AMERICAN): >60 ML/MIN/1.73 M^2
EST. GFR  (NON AFRICAN AMERICAN): >60 ML/MIN/1.73 M^2
GLUCOSE SERPL-MCNC: 93 MG/DL (ref 70–110)
HDLC SERPL-MCNC: 75 MG/DL (ref 40–75)
HDLC SERPL: 38.5 % (ref 20–50)
LDLC SERPL CALC-MCNC: 107.6 MG/DL (ref 63–159)
NONHDLC SERPL-MCNC: 120 MG/DL
POTASSIUM SERPL-SCNC: 3.9 MMOL/L (ref 3.5–5.1)
PROT SERPL-MCNC: 7 G/DL (ref 6–8.4)
SODIUM SERPL-SCNC: 141 MMOL/L (ref 136–145)
TRIGL SERPL-MCNC: 62 MG/DL (ref 30–150)

## 2019-10-01 ENCOUNTER — TELEPHONE (OUTPATIENT)
Dept: FAMILY MEDICINE | Facility: CLINIC | Age: 64
End: 2019-10-01

## 2019-10-01 NOTE — TELEPHONE ENCOUNTER
Patient called she will need another note stating she will not be able to go to Jury Duty due to restless legs.

## 2019-10-01 NOTE — TELEPHONE ENCOUNTER
----- Message from Mercedez Boss sent at 10/1/2019 10:11 AM CDT -----  Contact: pt [phone]  Patient called she will need another note stating she will not be able to go to Jury Duty.

## 2019-10-03 ENCOUNTER — TELEPHONE (OUTPATIENT)
Dept: FAMILY MEDICINE | Facility: CLINIC | Age: 64
End: 2019-10-03

## 2019-10-03 NOTE — TELEPHONE ENCOUNTER
Called patient to inform her that her letter regarding jury duty is ready for . Patient wanted me to let Dr. Feng know she would like for him to call her. She states that the last time she went to the gynecologist she was told she needed to discuss with her PCP about her bone density tests.

## 2019-10-10 DIAGNOSIS — M81.0 SENILE OSTEOPOROSIS: ICD-10-CM

## 2019-10-10 RX ORDER — ALENDRONATE SODIUM 70 MG/1
70 TABLET ORAL
Qty: 4 TABLET | Refills: 11 | Status: SHIPPED | OUTPATIENT
Start: 2019-10-10 | End: 2020-10-01 | Stop reason: ALTCHOICE

## 2019-10-23 ENCOUNTER — PATIENT MESSAGE (OUTPATIENT)
Dept: FAMILY MEDICINE | Facility: CLINIC | Age: 64
End: 2019-10-23

## 2019-10-26 ENCOUNTER — PATIENT MESSAGE (OUTPATIENT)
Dept: FAMILY MEDICINE | Facility: CLINIC | Age: 64
End: 2019-10-26

## 2019-11-04 DIAGNOSIS — Z12.31 ENCOUNTER FOR SCREENING MAMMOGRAM FOR MALIGNANT NEOPLASM OF BREAST: Primary | ICD-10-CM

## 2019-11-26 ENCOUNTER — HOSPITAL ENCOUNTER (OUTPATIENT)
Dept: RADIOLOGY | Facility: HOSPITAL | Age: 64
Discharge: HOME | End: 2019-11-26
Attending: FAMILY MEDICINE
Payer: MEDICARE

## 2019-11-26 VITALS — BODY MASS INDEX: 24.65 KG/M2 | HEIGHT: 64 IN | WEIGHT: 144.38 LBS

## 2019-11-26 DIAGNOSIS — Z12.39 SCREENING FOR MALIGNANT NEOPLASM OF BREAST: ICD-10-CM

## 2019-11-26 DIAGNOSIS — Z12.31 ENCOUNTER FOR SCREENING MAMMOGRAM FOR MALIGNANT NEOPLASM OF BREAST: ICD-10-CM

## 2019-11-26 DIAGNOSIS — Z12.39 BREAST CANCER SCREENING: ICD-10-CM

## 2019-11-26 PROCEDURE — 77067 SCR MAMMO BI INCL CAD: CPT | Mod: TC,PO

## 2020-05-27 ENCOUNTER — PES CALL (OUTPATIENT)
Dept: ADMINISTRATIVE | Facility: CLINIC | Age: 65
End: 2020-05-27

## 2020-06-18 DIAGNOSIS — Z12.31 ENCOUNTER FOR SCREENING MAMMOGRAM FOR BREAST CANCER: Primary | ICD-10-CM

## 2020-08-19 ENCOUNTER — PES CALL (OUTPATIENT)
Dept: ADMINISTRATIVE | Facility: CLINIC | Age: 65
End: 2020-08-19

## 2020-10-01 ENCOUNTER — OFFICE VISIT (OUTPATIENT)
Dept: FAMILY MEDICINE | Facility: CLINIC | Age: 65
End: 2020-10-01
Payer: MEDICARE

## 2020-10-01 ENCOUNTER — LAB VISIT (OUTPATIENT)
Dept: LAB | Facility: HOSPITAL | Age: 65
End: 2020-10-01
Attending: FAMILY MEDICINE
Payer: MEDICARE

## 2020-10-01 VITALS
TEMPERATURE: 97 F | HEART RATE: 80 BPM | SYSTOLIC BLOOD PRESSURE: 90 MMHG | DIASTOLIC BLOOD PRESSURE: 70 MMHG | BODY MASS INDEX: 25.06 KG/M2 | OXYGEN SATURATION: 97 % | RESPIRATION RATE: 12 BRPM | WEIGHT: 146.81 LBS | HEIGHT: 64 IN

## 2020-10-01 DIAGNOSIS — M81.0 SENILE OSTEOPOROSIS: ICD-10-CM

## 2020-10-01 DIAGNOSIS — Z11.59 ENCOUNTER FOR HEPATITIS C SCREENING TEST FOR LOW RISK PATIENT: ICD-10-CM

## 2020-10-01 DIAGNOSIS — M81.0 SENILE OSTEOPOROSIS: Primary | ICD-10-CM

## 2020-10-01 LAB — 25(OH)D3+25(OH)D2 SERPL-MCNC: 44 NG/ML (ref 30–96)

## 2020-10-01 PROCEDURE — 1126F PR PAIN SEVERITY QUANTIFIED, NO PAIN PRESENT: ICD-10-PCS | Mod: HCNC,S$GLB,, | Performed by: FAMILY MEDICINE

## 2020-10-01 PROCEDURE — 1126F AMNT PAIN NOTED NONE PRSNT: CPT | Mod: HCNC,S$GLB,, | Performed by: FAMILY MEDICINE

## 2020-10-01 PROCEDURE — G0009 PNEUMOCOCCAL CONJUGATE VACCINE 13-VALENT LESS THAN 5YO & GREATER THAN: ICD-10-PCS | Mod: HCNC,S$GLB,, | Performed by: FAMILY MEDICINE

## 2020-10-01 PROCEDURE — 3008F PR BODY MASS INDEX (BMI) DOCUMENTED: ICD-10-PCS | Mod: HCNC,CPTII,S$GLB, | Performed by: FAMILY MEDICINE

## 2020-10-01 PROCEDURE — 90662 FLU VACCINE - QUADRIVALENT - HIGH DOSE (65+) PRESERVATIVE FREE IM: ICD-10-PCS | Mod: HCNC,S$GLB,, | Performed by: FAMILY MEDICINE

## 2020-10-01 PROCEDURE — 90670 PNEUMOCOCCAL CONJUGATE VACCINE 13-VALENT LESS THAN 5YO & GREATER THAN: ICD-10-PCS | Mod: HCNC,S$GLB,, | Performed by: FAMILY MEDICINE

## 2020-10-01 PROCEDURE — 99213 PR OFFICE/OUTPT VISIT, EST, LEVL III, 20-29 MIN: ICD-10-PCS | Mod: 25,HCNC,S$GLB, | Performed by: FAMILY MEDICINE

## 2020-10-01 PROCEDURE — 99213 OFFICE O/P EST LOW 20 MIN: CPT | Mod: 25,HCNC,S$GLB, | Performed by: FAMILY MEDICINE

## 2020-10-01 PROCEDURE — 1159F PR MEDICATION LIST DOCUMENTED IN MEDICAL RECORD: ICD-10-PCS | Mod: HCNC,S$GLB,, | Performed by: FAMILY MEDICINE

## 2020-10-01 PROCEDURE — 36415 COLL VENOUS BLD VENIPUNCTURE: CPT | Mod: HCNC,PO

## 2020-10-01 PROCEDURE — 99999 PR PBB SHADOW E&M-EST. PATIENT-LVL V: CPT | Mod: PBBFAC,HCNC,, | Performed by: FAMILY MEDICINE

## 2020-10-01 PROCEDURE — 82306 VITAMIN D 25 HYDROXY: CPT | Mod: HCNC

## 2020-10-01 PROCEDURE — G0009 ADMIN PNEUMOCOCCAL VACCINE: HCPCS | Mod: HCNC,S$GLB,, | Performed by: FAMILY MEDICINE

## 2020-10-01 PROCEDURE — 90662 IIV NO PRSV INCREASED AG IM: CPT | Mod: HCNC,S$GLB,, | Performed by: FAMILY MEDICINE

## 2020-10-01 PROCEDURE — G0008 FLU VACCINE - QUADRIVALENT - HIGH DOSE (65+) PRESERVATIVE FREE IM: ICD-10-PCS | Mod: HCNC,S$GLB,, | Performed by: FAMILY MEDICINE

## 2020-10-01 PROCEDURE — 99999 PR PBB SHADOW E&M-EST. PATIENT-LVL V: ICD-10-PCS | Mod: PBBFAC,HCNC,, | Performed by: FAMILY MEDICINE

## 2020-10-01 PROCEDURE — 90670 PCV13 VACCINE IM: CPT | Mod: HCNC,S$GLB,, | Performed by: FAMILY MEDICINE

## 2020-10-01 PROCEDURE — G0008 ADMIN INFLUENZA VIRUS VAC: HCPCS | Mod: HCNC,S$GLB,, | Performed by: FAMILY MEDICINE

## 2020-10-01 PROCEDURE — 86803 HEPATITIS C AB TEST: CPT | Mod: HCNC

## 2020-10-01 PROCEDURE — 1101F PT FALLS ASSESS-DOCD LE1/YR: CPT | Mod: HCNC,CPTII,S$GLB, | Performed by: FAMILY MEDICINE

## 2020-10-01 PROCEDURE — 1101F PR PT FALLS ASSESS DOC 0-1 FALLS W/OUT INJ PAST YR: ICD-10-PCS | Mod: HCNC,CPTII,S$GLB, | Performed by: FAMILY MEDICINE

## 2020-10-01 PROCEDURE — 3008F BODY MASS INDEX DOCD: CPT | Mod: HCNC,CPTII,S$GLB, | Performed by: FAMILY MEDICINE

## 2020-10-01 PROCEDURE — 1159F MED LIST DOCD IN RCRD: CPT | Mod: HCNC,S$GLB,, | Performed by: FAMILY MEDICINE

## 2020-10-01 NOTE — PROGRESS NOTES
ID patient by name and date of birth.  Allergies confirmed.  PCV 13 and high dose flu given as per orders , using aseptic technique.  Patient tolerated well.  Information sheet reviewed and given to patient.

## 2020-10-02 LAB — HCV AB SERPL QL IA: NEGATIVE

## 2020-10-05 NOTE — PROGRESS NOTES
Subjective:       Patient ID: Jennifer Hurst is a 65 y.o. female.    Chief Complaint: Annual Exam    Patient presents here for annual follow-up of osteoporosis.  At her last visit, we discussed treatment of her osteoporosis with varying medications but she has chosen to really try to help with vitamin-D and calcium.  She will be due for another DEXA scan in about 4 months so will see with that 1 looks like if there has been any change in her osteoporosis at that time.  I did tell her that this is less likely to affect her osteoporosis in a positive manner and that the best treatment would be with medication either with oral Fosamax or with Prolia infusions.  We will wait to see what the next bone density exam shows.  She is complaining of some pain in her right 1st metacarpophalangeal joint.  She states she does a lot of yd work and noticed that it if her more pain after she does a lot of work in the yard and if she rests, it gets better.  I have suggested that she use either topical analgesics such as Aspercreme or as needed doses of ibuprofen or naproxen.  She also states that she has been having some ringing in her years for the last 3-4 weeks and she feels like they may be blocked.  As far as health maintenance, she is up-to-date with all of her recommended screening exams and immunizations with the exception of flu vaccine, shingles vaccine, hepatitis C screen, and pneumonia vaccine.  She is amenable to getting the flu vaccine and pneumonia vaccine today.    Review of Systems   Constitutional: Negative for chills, fatigue, fever and unexpected weight change.   HENT: Positive for tinnitus. Negative for nasal congestion, ear pain, postnasal drip and sore throat.    Eyes: Negative for pain and visual disturbance.   Respiratory: Negative for cough and shortness of breath.    Cardiovascular: Negative for chest pain and palpitations.   Gastrointestinal: Negative for abdominal pain, diarrhea, nausea and vomiting.    Genitourinary: Negative for difficulty urinating, dysuria, flank pain and pelvic pain.   Musculoskeletal: Positive for arthralgias. Negative for back pain.   Neurological: Negative for dizziness, light-headedness and headaches.   Psychiatric/Behavioral: Negative for sleep disturbance. The patient is not nervous/anxious.          Objective:      Physical Exam  Vitals signs reviewed.   Constitutional:       General: She is not in acute distress.     Appearance: Normal appearance. She is well-developed.   HENT:      Right Ear: Tympanic membrane and external ear normal.      Left Ear: Tympanic membrane and external ear normal.      Mouth/Throat:      Pharynx: Oropharynx is clear. No oropharyngeal exudate.   Neck:      Musculoskeletal: Normal range of motion and neck supple.      Thyroid: No thyromegaly.   Cardiovascular:      Rate and Rhythm: Normal rate and regular rhythm.      Heart sounds: Normal heart sounds. No murmur.   Pulmonary:      Effort: Pulmonary effort is normal.      Breath sounds: Normal breath sounds. No wheezing or rales.   Abdominal:      General: Bowel sounds are normal.      Palpations: Abdomen is soft. There is no mass.      Tenderness: There is no abdominal tenderness. There is no rebound.   Musculoskeletal: Normal range of motion.         General: No tenderness.      Right lower leg: No edema.      Left lower leg: No edema.   Lymphadenopathy:      Cervical: No cervical adenopathy.   Neurological:      General: No focal deficit present.      Mental Status: She is alert and oriented to person, place, and time.      Cranial Nerves: No cranial nerve deficit.      Deep Tendon Reflexes: Reflexes are normal and symmetric.   Psychiatric:         Mood and Affect: Mood normal.         Behavior: Behavior normal.         Assessment:       1. Senile osteoporosis    2. Encounter for hepatitis C screening test for low risk patient        Plan:       1.  Continue present non medication treatment for her  osteoporosis including calcium, vitamin-D supplementation, and exercise  2.  High-dose flu vaccine and Prevnar 13 today  3.  Schedule DEXA scan for 4 months from now  4.  Vitamin-D level and hepatitis C screen  4.  Follow up with me in 1 year or p.r.n.

## 2020-11-11 ENCOUNTER — PES CALL (OUTPATIENT)
Dept: ADMINISTRATIVE | Facility: CLINIC | Age: 65
End: 2020-11-11

## 2020-11-17 ENCOUNTER — PATIENT OUTREACH (OUTPATIENT)
Dept: ADMINISTRATIVE | Facility: HOSPITAL | Age: 65
End: 2020-11-17

## 2020-11-17 NOTE — PROGRESS NOTES
COLORECTAL gap report review. HM, chart, care everywhere, media reviewed.       updated with external report: COLONOSCOPY

## 2020-12-02 ENCOUNTER — HOSPITAL ENCOUNTER (OUTPATIENT)
Dept: RADIOLOGY | Facility: HOSPITAL | Age: 65
Discharge: HOME | End: 2020-12-02
Attending: SPECIALIST
Payer: MEDICARE

## 2020-12-02 VITALS — WEIGHT: 146.81 LBS | HEIGHT: 64 IN | BODY MASS INDEX: 25.06 KG/M2

## 2020-12-02 DIAGNOSIS — Z12.31 ENCOUNTER FOR SCREENING MAMMOGRAM FOR BREAST CANCER: ICD-10-CM

## 2020-12-02 PROCEDURE — 77067 SCR MAMMO BI INCL CAD: CPT | Mod: TC,PO

## 2020-12-23 ENCOUNTER — LAB VISIT (OUTPATIENT)
Dept: PRIMARY CARE CLINIC | Facility: OTHER | Age: 65
End: 2020-12-23
Attending: INTERNAL MEDICINE
Payer: MEDICARE

## 2020-12-23 DIAGNOSIS — Z03.818 ENCOUNTER FOR OBSERVATION FOR SUSPECTED EXPOSURE TO OTHER BIOLOGICAL AGENTS RULED OUT: ICD-10-CM

## 2020-12-23 DIAGNOSIS — R05.9 COUGH: ICD-10-CM

## 2020-12-23 PROCEDURE — U0003 INFECTIOUS AGENT DETECTION BY NUCLEIC ACID (DNA OR RNA); SEVERE ACUTE RESPIRATORY SYNDROME CORONAVIRUS 2 (SARS-COV-2) (CORONAVIRUS DISEASE [COVID-19]), AMPLIFIED PROBE TECHNIQUE, MAKING USE OF HIGH THROUGHPUT TECHNOLOGIES AS DESCRIBED BY CMS-2020-01-R: HCPCS | Mod: HCNC

## 2020-12-25 DIAGNOSIS — U07.1 COVID-19 VIRUS DETECTED: ICD-10-CM

## 2020-12-25 LAB — SARS-COV-2 RNA RESP QL NAA+PROBE: DETECTED

## 2021-03-01 ENCOUNTER — HOSPITAL ENCOUNTER (OUTPATIENT)
Dept: RADIOLOGY | Facility: HOSPITAL | Age: 66
Discharge: HOME | End: 2021-03-01
Attending: FAMILY MEDICINE
Payer: MEDICARE

## 2021-03-01 DIAGNOSIS — M81.0 SENILE OSTEOPOROSIS: ICD-10-CM

## 2021-03-01 PROCEDURE — 77080 DXA BONE DENSITY AXIAL: CPT | Mod: TC,PO

## 2021-03-25 ENCOUNTER — PES CALL (OUTPATIENT)
Dept: ADMINISTRATIVE | Facility: CLINIC | Age: 66
End: 2021-03-25

## 2021-04-23 ENCOUNTER — PES CALL (OUTPATIENT)
Dept: ADMINISTRATIVE | Facility: CLINIC | Age: 66
End: 2021-04-23

## 2021-06-14 ENCOUNTER — TELEPHONE (OUTPATIENT)
Dept: FAMILY MEDICINE | Facility: CLINIC | Age: 66
End: 2021-06-14

## 2021-07-07 ENCOUNTER — PES CALL (OUTPATIENT)
Dept: ADMINISTRATIVE | Facility: CLINIC | Age: 66
End: 2021-07-07

## 2021-10-28 DIAGNOSIS — Z12.31 ENCOUNTER FOR SCREENING MAMMOGRAM FOR MALIGNANT NEOPLASM OF BREAST: Primary | ICD-10-CM

## 2022-03-02 ENCOUNTER — HOSPITAL ENCOUNTER (OUTPATIENT)
Dept: RADIOLOGY | Facility: HOSPITAL | Age: 67
Discharge: HOME | End: 2022-03-02
Attending: SPECIALIST
Payer: MEDICARE

## 2022-03-02 DIAGNOSIS — Z12.31 ENCOUNTER FOR SCREENING MAMMOGRAM FOR MALIGNANT NEOPLASM OF BREAST: ICD-10-CM

## 2022-03-02 PROCEDURE — 77067 SCR MAMMO BI INCL CAD: CPT | Mod: TC,PO

## 2022-03-02 PROCEDURE — 77063 BREAST TOMOSYNTHESIS BI: CPT | Mod: TC,PO

## 2022-03-21 NOTE — TELEPHONE ENCOUNTER
Patient came in stating she needs a updated jury duty letter.  The letter from April is on your desk.  She also wants you to give her a call regarding her labs done at [facility].  Shirin Jack resulted, but patient wanted you to call her anyway. Her lab results were on your desk as well   Comments: Hands/wrists/forearm - 1450 mJ/cm2 (-50)\\nForehead- 400 mJ/cm2 (-50)\\nCheeks, chin - 400mJ/cm2 (-50)\\nscalp- 600 mJ/cm2  (-50)\\nleft foot- 1500 mJ/cm2 (-50)\\nchest - 850 mJ/cm2 (-50)\\nback, R hip- 850mJ/cm2 (-50)\\nNeck- 100 mJ/cm2 (dnt)\\nleft inner thigh - 400 (-50)\\n\\nPt notes she did not experience any burning last tx. Since she missed one tx last week, recommended decreasing all areas by 50, pt agrees.

## 2022-06-06 ENCOUNTER — LAB VISIT (OUTPATIENT)
Dept: LAB | Facility: HOSPITAL | Age: 67
End: 2022-06-06
Attending: FAMILY MEDICINE
Payer: MEDICARE

## 2022-06-06 ENCOUNTER — OFFICE VISIT (OUTPATIENT)
Dept: FAMILY MEDICINE | Facility: CLINIC | Age: 67
End: 2022-06-06
Payer: MEDICARE

## 2022-06-06 VITALS
DIASTOLIC BLOOD PRESSURE: 70 MMHG | HEIGHT: 64 IN | WEIGHT: 150.38 LBS | HEART RATE: 72 BPM | OXYGEN SATURATION: 97 % | SYSTOLIC BLOOD PRESSURE: 120 MMHG | BODY MASS INDEX: 25.67 KG/M2 | TEMPERATURE: 98 F

## 2022-06-06 DIAGNOSIS — Z82.49 FH: CAD (CORONARY ARTERY DISEASE): ICD-10-CM

## 2022-06-06 DIAGNOSIS — M81.0 SENILE OSTEOPOROSIS: ICD-10-CM

## 2022-06-06 DIAGNOSIS — R94.31 ABNORMAL ELECTROCARDIOGRAM (ECG) (EKG): ICD-10-CM

## 2022-06-06 DIAGNOSIS — M81.0 SENILE OSTEOPOROSIS: Primary | ICD-10-CM

## 2022-06-06 LAB
ALBUMIN SERPL BCP-MCNC: 4 G/DL (ref 3.5–5.2)
ALP SERPL-CCNC: 70 U/L (ref 55–135)
ALT SERPL W/O P-5'-P-CCNC: 17 U/L (ref 10–44)
ANION GAP SERPL CALC-SCNC: 11 MMOL/L (ref 8–16)
AST SERPL-CCNC: 18 U/L (ref 10–40)
BILIRUB SERPL-MCNC: 0.6 MG/DL (ref 0.1–1)
BUN SERPL-MCNC: 21 MG/DL (ref 8–23)
CALCIUM SERPL-MCNC: 9.6 MG/DL (ref 8.7–10.5)
CHLORIDE SERPL-SCNC: 105 MMOL/L (ref 95–110)
CHOLEST SERPL-MCNC: 208 MG/DL (ref 120–199)
CHOLEST/HDLC SERPL: 3.1 {RATIO} (ref 2–5)
CO2 SERPL-SCNC: 25 MMOL/L (ref 23–29)
CREAT SERPL-MCNC: 0.9 MG/DL (ref 0.5–1.4)
EST. GFR  (AFRICAN AMERICAN): >60 ML/MIN/1.73 M^2
EST. GFR  (NON AFRICAN AMERICAN): >60 ML/MIN/1.73 M^2
GLUCOSE SERPL-MCNC: 99 MG/DL (ref 70–110)
HDLC SERPL-MCNC: 68 MG/DL (ref 40–75)
HDLC SERPL: 32.7 % (ref 20–50)
LDLC SERPL CALC-MCNC: 122.2 MG/DL (ref 63–159)
NONHDLC SERPL-MCNC: 140 MG/DL
POTASSIUM SERPL-SCNC: 4.4 MMOL/L (ref 3.5–5.1)
PROT SERPL-MCNC: 7.4 G/DL (ref 6–8.4)
SODIUM SERPL-SCNC: 141 MMOL/L (ref 136–145)
TRIGL SERPL-MCNC: 89 MG/DL (ref 30–150)

## 2022-06-06 PROCEDURE — 1101F PT FALLS ASSESS-DOCD LE1/YR: CPT | Mod: CPTII,S$GLB,, | Performed by: FAMILY MEDICINE

## 2022-06-06 PROCEDURE — 93005 EKG 12-LEAD: ICD-10-PCS | Mod: S$GLB,,, | Performed by: FAMILY MEDICINE

## 2022-06-06 PROCEDURE — 99999 PR PBB SHADOW E&M-EST. PATIENT-LVL III: ICD-10-PCS | Mod: PBBFAC,,, | Performed by: FAMILY MEDICINE

## 2022-06-06 PROCEDURE — 99999 PR PBB SHADOW E&M-EST. PATIENT-LVL III: CPT | Mod: PBBFAC,,, | Performed by: FAMILY MEDICINE

## 2022-06-06 PROCEDURE — 3288F PR FALLS RISK ASSESSMENT DOCUMENTED: ICD-10-PCS | Mod: CPTII,S$GLB,, | Performed by: FAMILY MEDICINE

## 2022-06-06 PROCEDURE — 36415 COLL VENOUS BLD VENIPUNCTURE: CPT | Performed by: FAMILY MEDICINE

## 2022-06-06 PROCEDURE — 3288F FALL RISK ASSESSMENT DOCD: CPT | Mod: CPTII,S$GLB,, | Performed by: FAMILY MEDICINE

## 2022-06-06 PROCEDURE — 3074F PR MOST RECENT SYSTOLIC BLOOD PRESSURE < 130 MM HG: ICD-10-PCS | Mod: CPTII,S$GLB,, | Performed by: FAMILY MEDICINE

## 2022-06-06 PROCEDURE — 3008F PR BODY MASS INDEX (BMI) DOCUMENTED: ICD-10-PCS | Mod: CPTII,S$GLB,, | Performed by: FAMILY MEDICINE

## 2022-06-06 PROCEDURE — 99213 OFFICE O/P EST LOW 20 MIN: CPT | Mod: S$GLB,,, | Performed by: FAMILY MEDICINE

## 2022-06-06 PROCEDURE — 3078F PR MOST RECENT DIASTOLIC BLOOD PRESSURE < 80 MM HG: ICD-10-PCS | Mod: CPTII,S$GLB,, | Performed by: FAMILY MEDICINE

## 2022-06-06 PROCEDURE — 3074F SYST BP LT 130 MM HG: CPT | Mod: CPTII,S$GLB,, | Performed by: FAMILY MEDICINE

## 2022-06-06 PROCEDURE — 1159F PR MEDICATION LIST DOCUMENTED IN MEDICAL RECORD: ICD-10-PCS | Mod: CPTII,S$GLB,, | Performed by: FAMILY MEDICINE

## 2022-06-06 PROCEDURE — 1160F RVW MEDS BY RX/DR IN RCRD: CPT | Mod: CPTII,S$GLB,, | Performed by: FAMILY MEDICINE

## 2022-06-06 PROCEDURE — 1101F PR PT FALLS ASSESS DOC 0-1 FALLS W/OUT INJ PAST YR: ICD-10-PCS | Mod: CPTII,S$GLB,, | Performed by: FAMILY MEDICINE

## 2022-06-06 PROCEDURE — 1126F PR PAIN SEVERITY QUANTIFIED, NO PAIN PRESENT: ICD-10-PCS | Mod: CPTII,S$GLB,, | Performed by: FAMILY MEDICINE

## 2022-06-06 PROCEDURE — 80061 LIPID PANEL: CPT | Performed by: FAMILY MEDICINE

## 2022-06-06 PROCEDURE — 3078F DIAST BP <80 MM HG: CPT | Mod: CPTII,S$GLB,, | Performed by: FAMILY MEDICINE

## 2022-06-06 PROCEDURE — 3008F BODY MASS INDEX DOCD: CPT | Mod: CPTII,S$GLB,, | Performed by: FAMILY MEDICINE

## 2022-06-06 PROCEDURE — 93005 ELECTROCARDIOGRAM TRACING: CPT | Mod: S$GLB,,, | Performed by: FAMILY MEDICINE

## 2022-06-06 PROCEDURE — 1159F MED LIST DOCD IN RCRD: CPT | Mod: CPTII,S$GLB,, | Performed by: FAMILY MEDICINE

## 2022-06-06 PROCEDURE — 99213 PR OFFICE/OUTPT VISIT, EST, LEVL III, 20-29 MIN: ICD-10-PCS | Mod: S$GLB,,, | Performed by: FAMILY MEDICINE

## 2022-06-06 PROCEDURE — 1160F PR REVIEW ALL MEDS BY PRESCRIBER/CLIN PHARMACIST DOCUMENTED: ICD-10-PCS | Mod: CPTII,S$GLB,, | Performed by: FAMILY MEDICINE

## 2022-06-06 PROCEDURE — 80053 COMPREHEN METABOLIC PANEL: CPT | Performed by: FAMILY MEDICINE

## 2022-06-06 PROCEDURE — 93010 ELECTROCARDIOGRAM REPORT: CPT | Mod: S$GLB,,, | Performed by: INTERNAL MEDICINE

## 2022-06-06 PROCEDURE — 93010 EKG 12-LEAD: ICD-10-PCS | Mod: S$GLB,,, | Performed by: INTERNAL MEDICINE

## 2022-06-06 PROCEDURE — 1126F AMNT PAIN NOTED NONE PRSNT: CPT | Mod: CPTII,S$GLB,, | Performed by: FAMILY MEDICINE

## 2022-06-08 ENCOUNTER — PATIENT MESSAGE (OUTPATIENT)
Dept: FAMILY MEDICINE | Facility: CLINIC | Age: 67
End: 2022-06-08
Payer: MEDICARE

## 2022-06-11 NOTE — PROGRESS NOTES
Subjective:       Patient ID: Jennifer Hurst is a 67 y.o. female.    Chief Complaint: Annual Exam    Patient presents here for annual exam and follow-up of osteoporosis.  She states she has been doing well and there is no change in her medical or surgical history over the last year since I saw her last.  She did have COVID-19 in March of 2021 but has no sequelae from this.  She does follow a healthy diet and exercises on a regular basis and her weight is increased 4 lb in the last year and a half.  She has no medical problems other than the osteoporosis and her only medications or over-the-counter calcium with vitamin-D as well as fish oil and multivitamin.  She does have a strong family history of coronary disease but she is having no symptoms of coronary disease.  Her last stress test was in April of 2013 so we both discussed this and decided it would be prudent to do another stress test at this time.  As far as health maintenance, she is up-to-date with all of her recommended screening exams and immunizations with exception of COVID-19 vaccine which she declined, shingles vaccine, and her 2nd pneumococcal vaccine.    Review of Systems   Constitutional: Negative for chills, fatigue, fever and unexpected weight change.   HENT: Negative for nasal congestion, postnasal drip and sore throat.    Respiratory: Negative for cough, shortness of breath and wheezing.    Cardiovascular: Negative for chest pain, palpitations and leg swelling.   Gastrointestinal: Negative for abdominal pain, diarrhea, nausea and vomiting.   Genitourinary: Negative for difficulty urinating, dysuria and flank pain.   Musculoskeletal: Negative for arthralgias and back pain.   Neurological: Negative for dizziness, light-headedness and headaches.   Psychiatric/Behavioral: Negative for sleep disturbance. The patient is not nervous/anxious.          Objective:      Physical Exam  Vitals reviewed.   Constitutional:       General: She is not in acute  distress.     Appearance: Normal appearance. She is well-developed.   HENT:      Right Ear: Tympanic membrane and external ear normal.      Left Ear: Tympanic membrane and external ear normal.      Mouth/Throat:      Pharynx: Oropharynx is clear. No posterior oropharyngeal erythema.   Neck:      Thyroid: No thyromegaly.   Cardiovascular:      Rate and Rhythm: Normal rate and regular rhythm.      Pulses: Normal pulses.      Heart sounds: Normal heart sounds. No murmur heard.  Pulmonary:      Effort: Pulmonary effort is normal.      Breath sounds: Normal breath sounds. No wheezing or rales.   Musculoskeletal:         General: No tenderness. Normal range of motion.      Cervical back: Normal range of motion and neck supple.      Right lower leg: No edema.      Left lower leg: No edema.   Lymphadenopathy:      Cervical: No cervical adenopathy.   Neurological:      General: No focal deficit present.      Mental Status: She is alert and oriented to person, place, and time.      Cranial Nerves: No cranial nerve deficit.      Deep Tendon Reflexes: Reflexes are normal and symmetric.   Psychiatric:         Mood and Affect: Mood normal.         Behavior: Behavior normal.         EKG- normal sinus rhythm with a rate of 67; slightly diminished R-wave progression in V3 but no acute ischemic changes and no significant changes when compared to her previous EKG     Assessment:       Problem List Items Addressed This Visit     Senile osteoporosis - Primary    Relevant Orders    Comprehensive Metabolic Panel (Completed)      Other Visit Diagnoses     FH: CAD (coronary artery disease)        Relevant Orders    EKG 12-lead (Completed)    Stress Echo Which stress agent will be used? Treadmill Exercise; Color Flow Doppler? No    Comprehensive Metabolic Panel (Completed)    Lipid Panel (Completed)    Abnormal electrocardiogram (ECG) (EKG)         Relevant Orders    Stress Echo Which stress agent will be used? Treadmill Exercise; Color Flow  Doppler? No          Plan:       1. Continue present medications and exercise for her osteoporosis  2. Encouraged healthy diet as she is doing as well as regular exercise.  Her BMI today is 25.81  3. CMP and lipid profile  4. Schedule stress echo  5. Follow up with me in 1 year or p.r.n.

## 2022-06-23 ENCOUNTER — CLINICAL SUPPORT (OUTPATIENT)
Dept: CARDIOLOGY | Facility: HOSPITAL | Age: 67
End: 2022-06-23
Attending: FAMILY MEDICINE
Payer: MEDICARE

## 2022-06-23 DIAGNOSIS — R94.31 ABNORMAL ELECTROCARDIOGRAM (ECG) (EKG): ICD-10-CM

## 2022-06-23 DIAGNOSIS — Z82.49 FH: CAD (CORONARY ARTERY DISEASE): ICD-10-CM

## 2022-06-23 LAB
CV STRESS BASE HR: 75 BPM
DIASTOLIC BLOOD PRESSURE: 71 MMHG
EJECTION FRACTION: 55 %
OHS CV CPX 1 MINUTE RECOVERY HEART RATE: 133 BPM
OHS CV CPX 85 PERCENT MAX PREDICTED HEART RATE MALE: 123
OHS CV CPX ESTIMATED METS: 14
OHS CV CPX MAX PREDICTED HEART RATE: 145
OHS CV CPX PATIENT IS FEMALE: 1
OHS CV CPX PATIENT IS MALE: 0
OHS CV CPX PEAK DIASTOLIC BLOOD PRESSURE: 72 MMHG
OHS CV CPX PEAK HEAR RATE: 176 BPM
OHS CV CPX PEAK RATE PRESSURE PRODUCT: NORMAL
OHS CV CPX PEAK SYSTOLIC BLOOD PRESSURE: 173 MMHG
OHS CV CPX PERCENT MAX PREDICTED HEART RATE ACHIEVED: 121
OHS CV CPX RATE PRESSURE PRODUCT PRESENTING: 7500
STRESS ECHO POST EXERCISE DUR MIN: 13 MINUTES
STRESS ECHO POST EXERCISE DUR SEC: 0 SECONDS
SYSTOLIC BLOOD PRESSURE: 100 MMHG

## 2022-06-23 PROCEDURE — 93351 STRESS TTE COMPLETE: CPT | Mod: 26,,, | Performed by: INTERNAL MEDICINE

## 2022-06-23 PROCEDURE — 93351 STRESS TTE COMPLETE: CPT

## 2022-06-23 PROCEDURE — 93351 STRESS ECHO (CUPID ONLY): ICD-10-PCS | Mod: 26,,, | Performed by: INTERNAL MEDICINE

## 2023-02-09 DIAGNOSIS — Z00.00 ENCOUNTER FOR MEDICARE ANNUAL WELLNESS EXAM: ICD-10-CM

## 2023-03-07 DIAGNOSIS — Z12.31 ENCOUNTER FOR SCREENING MAMMOGRAM FOR MALIGNANT NEOPLASM OF BREAST: Primary | ICD-10-CM

## 2023-03-13 ENCOUNTER — PES CALL (OUTPATIENT)
Dept: ADMINISTRATIVE | Facility: CLINIC | Age: 68
End: 2023-03-13
Payer: MEDICARE

## 2023-03-21 ENCOUNTER — HOSPITAL ENCOUNTER (OUTPATIENT)
Dept: RADIOLOGY | Facility: HOSPITAL | Age: 68
Discharge: HOME | End: 2023-03-21
Attending: SPECIALIST
Payer: MEDICARE

## 2023-03-21 DIAGNOSIS — Z12.31 ENCOUNTER FOR SCREENING MAMMOGRAM FOR MALIGNANT NEOPLASM OF BREAST: ICD-10-CM

## 2023-03-21 PROCEDURE — 77067 SCR MAMMO BI INCL CAD: CPT | Mod: TC,PO

## 2023-05-02 ENCOUNTER — TELEPHONE (OUTPATIENT)
Dept: FAMILY MEDICINE | Facility: CLINIC | Age: 68
End: 2023-05-02
Payer: MEDICARE

## 2023-05-02 NOTE — TELEPHONE ENCOUNTER
Nelly PALACIOS Jr. Staff  Caller: patient (Today, 10:16 AM)    ----- Message from Nelly Carter sent at 5/2/2023 10:16 AM CDT -----  Regarding: same day apt  Contact: patient  Type:  Same Day Appointment Request    Caller is requesting a same day appointment.  Caller declined first available appointment listed below.      Name of Caller:  Patient  When is the first available appointment?    Symptoms:  not feeling well- congestion  Best Call Back Number:  [phone]    Additional Information:   Please call pt to schedule thanks!

## 2023-05-02 NOTE — TELEPHONE ENCOUNTER
Call placed to patient. Offered appointment on tomorrow 5-2-23. Patient states would like to be seen today, and will go to urgent care.

## 2023-05-08 ENCOUNTER — PES CALL (OUTPATIENT)
Dept: ADMINISTRATIVE | Facility: CLINIC | Age: 68
End: 2023-05-08
Payer: MEDICARE

## 2023-05-11 ENCOUNTER — TELEPHONE (OUTPATIENT)
Dept: FAMILY MEDICINE | Facility: CLINIC | Age: 68
End: 2023-05-11
Payer: MEDICARE

## 2023-05-11 NOTE — TELEPHONE ENCOUNTER
Yajaira Loving, Patient Care Assistant  ROMY PALACIOS Jr. Staff    ----- Message from Yajaira Loving Patient Care Assistant sent at 5/11/2023 12:06 PM CDT -----  Contact: self  Pt is f/u on a message to be seen today on soon. Please call back to advise [phone]  thanks

## 2023-06-08 ENCOUNTER — OFFICE VISIT (OUTPATIENT)
Dept: FAMILY MEDICINE | Facility: CLINIC | Age: 68
End: 2023-06-08
Payer: MEDICARE

## 2023-06-08 VITALS
BODY MASS INDEX: 25.48 KG/M2 | OXYGEN SATURATION: 98 % | SYSTOLIC BLOOD PRESSURE: 110 MMHG | HEIGHT: 64 IN | DIASTOLIC BLOOD PRESSURE: 68 MMHG | WEIGHT: 149.25 LBS | TEMPERATURE: 98 F | HEART RATE: 66 BPM

## 2023-06-08 DIAGNOSIS — M81.0 SENILE OSTEOPOROSIS: Primary | ICD-10-CM

## 2023-06-08 PROCEDURE — 1101F PR PT FALLS ASSESS DOC 0-1 FALLS W/OUT INJ PAST YR: ICD-10-PCS | Mod: CPTII,S$GLB,, | Performed by: FAMILY MEDICINE

## 2023-06-08 PROCEDURE — 90732 PPSV23 VACC 2 YRS+ SUBQ/IM: CPT | Mod: S$GLB,,, | Performed by: FAMILY MEDICINE

## 2023-06-08 PROCEDURE — 3008F PR BODY MASS INDEX (BMI) DOCUMENTED: ICD-10-PCS | Mod: CPTII,S$GLB,, | Performed by: FAMILY MEDICINE

## 2023-06-08 PROCEDURE — 3074F PR MOST RECENT SYSTOLIC BLOOD PRESSURE < 130 MM HG: ICD-10-PCS | Mod: CPTII,S$GLB,, | Performed by: FAMILY MEDICINE

## 2023-06-08 PROCEDURE — 99999 PR PBB SHADOW E&M-EST. PATIENT-LVL III: CPT | Mod: PBBFAC,,, | Performed by: FAMILY MEDICINE

## 2023-06-08 PROCEDURE — 99213 PR OFFICE/OUTPT VISIT, EST, LEVL III, 20-29 MIN: ICD-10-PCS | Mod: S$GLB,,, | Performed by: FAMILY MEDICINE

## 2023-06-08 PROCEDURE — 1101F PT FALLS ASSESS-DOCD LE1/YR: CPT | Mod: CPTII,S$GLB,, | Performed by: FAMILY MEDICINE

## 2023-06-08 PROCEDURE — 1159F PR MEDICATION LIST DOCUMENTED IN MEDICAL RECORD: ICD-10-PCS | Mod: CPTII,S$GLB,, | Performed by: FAMILY MEDICINE

## 2023-06-08 PROCEDURE — 3078F DIAST BP <80 MM HG: CPT | Mod: CPTII,S$GLB,, | Performed by: FAMILY MEDICINE

## 2023-06-08 PROCEDURE — 3078F PR MOST RECENT DIASTOLIC BLOOD PRESSURE < 80 MM HG: ICD-10-PCS | Mod: CPTII,S$GLB,, | Performed by: FAMILY MEDICINE

## 2023-06-08 PROCEDURE — 1126F AMNT PAIN NOTED NONE PRSNT: CPT | Mod: CPTII,S$GLB,, | Performed by: FAMILY MEDICINE

## 2023-06-08 PROCEDURE — 99213 OFFICE O/P EST LOW 20 MIN: CPT | Mod: S$GLB,,, | Performed by: FAMILY MEDICINE

## 2023-06-08 PROCEDURE — 99999 PR PBB SHADOW E&M-EST. PATIENT-LVL III: ICD-10-PCS | Mod: PBBFAC,,, | Performed by: FAMILY MEDICINE

## 2023-06-08 PROCEDURE — 1160F PR REVIEW ALL MEDS BY PRESCRIBER/CLIN PHARMACIST DOCUMENTED: ICD-10-PCS | Mod: CPTII,S$GLB,, | Performed by: FAMILY MEDICINE

## 2023-06-08 PROCEDURE — 90732 PNEUMOCOCCAL POLYSACCHARIDE VACCINE 23-VALENT =>2YO SQ IM: ICD-10-PCS | Mod: S$GLB,,, | Performed by: FAMILY MEDICINE

## 2023-06-08 PROCEDURE — 1126F PR PAIN SEVERITY QUANTIFIED, NO PAIN PRESENT: ICD-10-PCS | Mod: CPTII,S$GLB,, | Performed by: FAMILY MEDICINE

## 2023-06-08 PROCEDURE — 1159F MED LIST DOCD IN RCRD: CPT | Mod: CPTII,S$GLB,, | Performed by: FAMILY MEDICINE

## 2023-06-08 PROCEDURE — 3008F BODY MASS INDEX DOCD: CPT | Mod: CPTII,S$GLB,, | Performed by: FAMILY MEDICINE

## 2023-06-08 PROCEDURE — 1160F RVW MEDS BY RX/DR IN RCRD: CPT | Mod: CPTII,S$GLB,, | Performed by: FAMILY MEDICINE

## 2023-06-08 PROCEDURE — 3288F PR FALLS RISK ASSESSMENT DOCUMENTED: ICD-10-PCS | Mod: CPTII,S$GLB,, | Performed by: FAMILY MEDICINE

## 2023-06-08 PROCEDURE — 3288F FALL RISK ASSESSMENT DOCD: CPT | Mod: CPTII,S$GLB,, | Performed by: FAMILY MEDICINE

## 2023-06-08 PROCEDURE — G0009 ADMIN PNEUMOCOCCAL VACCINE: HCPCS | Mod: S$GLB,,, | Performed by: FAMILY MEDICINE

## 2023-06-08 PROCEDURE — G0009 PNEUMOCOCCAL POLYSACCHARIDE VACCINE 23-VALENT =>2YO SQ IM: ICD-10-PCS | Mod: S$GLB,,, | Performed by: FAMILY MEDICINE

## 2023-06-08 PROCEDURE — 3074F SYST BP LT 130 MM HG: CPT | Mod: CPTII,S$GLB,, | Performed by: FAMILY MEDICINE

## 2023-06-11 NOTE — PROGRESS NOTES
Subjective     Patient ID: Jennifer Hurst is a 68 y.o. female.    Chief Complaint: Osteoporosis    Patient presents here for annual follow-up of osteoporosis.  Her weight is is decreased 1 lb over the last year and her BMI today is 25.62.  She has started some regular exercise recently in order to lose some of her weight.  She has no change in her medical or surgical history over the last year.  She does continue to use calcium and vitamin-D for her osteoporosis and prefers not to take medication unless absolutely necessary.  Her last DEXA scan was March of 2021 at which time her osteoporosis had improved to osteopenia.  She has no other medical issues at this time and no other complaints.  As far as health maintenance, she is up-to-date with all of her recommended screening exams and immunizations with the exception of COVID vaccine, last pneumococcal vaccine, shingles vaccine, and hemoglobin A1c for diabetic screening.  She is not due for blood work at this time as all of her labs were normal last year and do not need to be repeated every year.  These can be done next year and we will do the hemoglobin A1c for screening at that time.  She is amenable to getting her Pneumovax today to complete her pneumococcal prophylaxis.    Review of Systems   Constitutional:  Negative for chills, fatigue, fever and unexpected weight change.   HENT:  Negative for nasal congestion, postnasal drip and sore throat.    Respiratory:  Negative for cough and shortness of breath.    Cardiovascular:  Negative for chest pain and palpitations.   Gastrointestinal:  Negative for abdominal pain, diarrhea, nausea and vomiting.   Genitourinary:  Negative for difficulty urinating, dysuria and flank pain.        Up-to-date with Pap smear and mammogram   Musculoskeletal:  Negative for arthralgias and back pain.   Neurological:  Negative for dizziness, light-headedness and headaches.        Objective     Physical Exam  Vitals reviewed.    Constitutional:       General: She is not in acute distress.     Appearance: Normal appearance. She is well-developed.   HENT:      Right Ear: Tympanic membrane and external ear normal.      Left Ear: Tympanic membrane and external ear normal.      Mouth/Throat:      Pharynx: Oropharynx is clear. No posterior oropharyngeal erythema.   Neck:      Thyroid: No thyromegaly.      Vascular: No carotid bruit.   Cardiovascular:      Rate and Rhythm: Normal rate and regular rhythm.      Pulses: Normal pulses.      Heart sounds: Normal heart sounds. No murmur heard.  Pulmonary:      Effort: Pulmonary effort is normal.      Breath sounds: Normal breath sounds. No wheezing or rales.   Abdominal:      General: Bowel sounds are normal.      Palpations: Abdomen is soft. There is no mass.      Tenderness: There is no abdominal tenderness. There is no rebound.   Musculoskeletal:         General: No tenderness. Normal range of motion.      Cervical back: Normal range of motion and neck supple.      Right lower leg: No edema.      Left lower leg: No edema.   Lymphadenopathy:      Cervical: No cervical adenopathy.   Neurological:      General: No focal deficit present.      Mental Status: She is alert and oriented to person, place, and time.      Cranial Nerves: No cranial nerve deficit.      Deep Tendon Reflexes: Reflexes are normal and symmetric.          Assessment and Plan     1. Senile osteoporosis    Other orders  -     Pneumococcal Polysaccharide Vaccine (23 Valent) (SQ/IM)        1. Continue treatment of osteoporosis with calcium and vitamin-D as well as exercise   2. Continue regular exercise program as well as continuing healthy diet  3. Pneumovax 23 today  4. Follow up with me in 1 year or p.r.n.          Follow up in about 1 year (around 6/8/2024).

## 2023-08-15 NOTE — TELEPHONE ENCOUNTER
Spoke with the patient about results of DEXA scan.  This shows evidence now of osteoporosis in both the hip and spine.  Discuss with the patient the need for medical treatment and discussed oral treatment with Fosamax verses IV infusion with Prolia.  Answered patient's questions.  After discussion, we have mutually decided to try Fosamax 70 mg weekly since she has no history of any GI problems. I also advised her to get a dental checkup and let them know that she is now starting Fosamax and make sure that she has a dental evaluation annually.  Prescription was sent to [facility] on [address]   at home:

## 2024-03-01 DIAGNOSIS — Z12.31 ENCOUNTER FOR SCREENING MAMMOGRAM FOR MALIGNANT NEOPLASM OF BREAST: Primary | ICD-10-CM

## 2024-03-22 ENCOUNTER — HOSPITAL ENCOUNTER (OUTPATIENT)
Dept: RADIOLOGY | Facility: HOSPITAL | Age: 69
Discharge: HOME | End: 2024-03-22
Attending: SPECIALIST
Payer: MEDICARE

## 2024-03-22 VITALS — HEIGHT: 64 IN | BODY MASS INDEX: 25.48 KG/M2 | WEIGHT: 149.25 LBS

## 2024-03-22 DIAGNOSIS — Z12.31 ENCOUNTER FOR SCREENING MAMMOGRAM FOR MALIGNANT NEOPLASM OF BREAST: ICD-10-CM

## 2024-03-22 PROCEDURE — 77067 SCR MAMMO BI INCL CAD: CPT | Mod: TC,PO

## 2024-04-03 DIAGNOSIS — Z78.0 MENOPAUSE: ICD-10-CM

## 2024-04-05 ENCOUNTER — PATIENT MESSAGE (OUTPATIENT)
Dept: ADMINISTRATIVE | Facility: HOSPITAL | Age: 69
End: 2024-04-05
Payer: MEDICARE

## 2024-04-05 ENCOUNTER — PATIENT OUTREACH (OUTPATIENT)
Dept: ADMINISTRATIVE | Facility: HOSPITAL | Age: 69
End: 2024-04-05
Payer: MEDICARE

## 2024-04-05 NOTE — PROGRESS NOTES
Population Health Chart Review & Patient Outreach Details      Additional [facility] Notes:    Osteoporosis screening (DEXA SCAN)     Non-compliant report chart audits for Osteoporosis screening (DEXA SCAN)     Outreach to patient in reference to SCHEDULING A Dexa Scan             Updates Requested / Reviewed:        Health Maintenance Topics Overdue:      VB Score: 1     Osteoporosis Screening    Influenza Vaccine  Shingles/Zoster Vaccine  RSV Vaccine                  Health Maintenance Topic(s) Outreach Outcomes & Actions Taken:    Osteoporosis Screening - Outreach Outcomes & Actions Taken  : Dexa Order Placed

## 2024-06-24 ENCOUNTER — LAB VISIT (OUTPATIENT)
Dept: LAB | Facility: HOSPITAL | Age: 69
End: 2024-06-24
Attending: FAMILY MEDICINE
Payer: MEDICARE

## 2024-06-24 ENCOUNTER — OFFICE VISIT (OUTPATIENT)
Dept: FAMILY MEDICINE | Facility: CLINIC | Age: 69
End: 2024-06-24
Payer: MEDICARE

## 2024-06-24 VITALS
BODY MASS INDEX: 24.24 KG/M2 | SYSTOLIC BLOOD PRESSURE: 100 MMHG | HEART RATE: 68 BPM | DIASTOLIC BLOOD PRESSURE: 64 MMHG | HEIGHT: 64 IN | TEMPERATURE: 98 F | OXYGEN SATURATION: 95 % | WEIGHT: 142 LBS

## 2024-06-24 DIAGNOSIS — M81.0 SENILE OSTEOPOROSIS: Primary | ICD-10-CM

## 2024-06-24 DIAGNOSIS — E78.2 MIXED HYPERLIPIDEMIA: ICD-10-CM

## 2024-06-24 LAB
ALBUMIN SERPL BCP-MCNC: 4 G/DL (ref 3.5–5.2)
ALP SERPL-CCNC: 61 U/L (ref 55–135)
ALT SERPL W/O P-5'-P-CCNC: 15 U/L (ref 10–44)
ANION GAP SERPL CALC-SCNC: 6 MMOL/L (ref 8–16)
AST SERPL-CCNC: 19 U/L (ref 10–40)
BILIRUB SERPL-MCNC: 0.6 MG/DL (ref 0.1–1)
BUN SERPL-MCNC: 22 MG/DL (ref 8–23)
CALCIUM SERPL-MCNC: 9.2 MG/DL (ref 8.7–10.5)
CHLORIDE SERPL-SCNC: 107 MMOL/L (ref 95–110)
CHOLEST SERPL-MCNC: 201 MG/DL (ref 120–199)
CHOLEST/HDLC SERPL: 2.9 {RATIO} (ref 2–5)
CO2 SERPL-SCNC: 26 MMOL/L (ref 23–29)
CREAT SERPL-MCNC: 0.9 MG/DL (ref 0.5–1.4)
EST. GFR  (NO RACE VARIABLE): >60 ML/MIN/1.73 M^2
GLUCOSE SERPL-MCNC: 99 MG/DL (ref 70–110)
HDLC SERPL-MCNC: 69 MG/DL (ref 40–75)
HDLC SERPL: 34.3 % (ref 20–50)
LDLC SERPL CALC-MCNC: 121.4 MG/DL (ref 63–159)
NONHDLC SERPL-MCNC: 132 MG/DL
POTASSIUM SERPL-SCNC: 4.3 MMOL/L (ref 3.5–5.1)
PROT SERPL-MCNC: 7 G/DL (ref 6–8.4)
SODIUM SERPL-SCNC: 139 MMOL/L (ref 136–145)
TRIGL SERPL-MCNC: 53 MG/DL (ref 30–150)

## 2024-06-24 PROCEDURE — 1126F AMNT PAIN NOTED NONE PRSNT: CPT | Mod: HCNC,CPTII,S$GLB, | Performed by: FAMILY MEDICINE

## 2024-06-24 PROCEDURE — 80053 COMPREHEN METABOLIC PANEL: CPT | Mod: HCNC | Performed by: FAMILY MEDICINE

## 2024-06-24 PROCEDURE — 99213 OFFICE O/P EST LOW 20 MIN: CPT | Mod: HCNC,S$GLB,, | Performed by: FAMILY MEDICINE

## 2024-06-24 PROCEDURE — 1159F MED LIST DOCD IN RCRD: CPT | Mod: HCNC,CPTII,S$GLB, | Performed by: FAMILY MEDICINE

## 2024-06-24 PROCEDURE — 99999 PR PBB SHADOW E&M-EST. PATIENT-LVL III: CPT | Mod: PBBFAC,HCNC,, | Performed by: FAMILY MEDICINE

## 2024-06-24 PROCEDURE — 3288F FALL RISK ASSESSMENT DOCD: CPT | Mod: HCNC,CPTII,S$GLB, | Performed by: FAMILY MEDICINE

## 2024-06-24 PROCEDURE — 1101F PT FALLS ASSESS-DOCD LE1/YR: CPT | Mod: HCNC,CPTII,S$GLB, | Performed by: FAMILY MEDICINE

## 2024-06-24 PROCEDURE — 3078F DIAST BP <80 MM HG: CPT | Mod: HCNC,CPTII,S$GLB, | Performed by: FAMILY MEDICINE

## 2024-06-24 PROCEDURE — 80061 LIPID PANEL: CPT | Mod: HCNC | Performed by: FAMILY MEDICINE

## 2024-06-24 PROCEDURE — 1160F RVW MEDS BY RX/DR IN RCRD: CPT | Mod: HCNC,CPTII,S$GLB, | Performed by: FAMILY MEDICINE

## 2024-06-24 PROCEDURE — 3008F BODY MASS INDEX DOCD: CPT | Mod: HCNC,CPTII,S$GLB, | Performed by: FAMILY MEDICINE

## 2024-06-24 PROCEDURE — 3074F SYST BP LT 130 MM HG: CPT | Mod: HCNC,CPTII,S$GLB, | Performed by: FAMILY MEDICINE

## 2024-06-24 PROCEDURE — 36415 COLL VENOUS BLD VENIPUNCTURE: CPT | Mod: HCNC,PO | Performed by: FAMILY MEDICINE

## 2024-06-24 RX ORDER — IBUPROFEN 100 MG/5ML
1000 SUSPENSION, ORAL (FINAL DOSE FORM) ORAL DAILY
COMMUNITY

## 2024-06-26 ENCOUNTER — HOSPITAL ENCOUNTER (OUTPATIENT)
Dept: RADIOLOGY | Facility: CLINIC | Age: 69
Discharge: HOME | End: 2024-06-26
Attending: FAMILY MEDICINE
Payer: MEDICARE

## 2024-06-26 DIAGNOSIS — Z78.0 MENOPAUSE: ICD-10-CM

## 2024-06-26 PROCEDURE — 77080 DXA BONE DENSITY AXIAL: CPT | Mod: TC,HCNC,PO

## 2024-06-26 PROCEDURE — 77080 DXA BONE DENSITY AXIAL: CPT | Mod: 26,HCNC,, | Performed by: RADIOLOGY

## 2024-06-26 NOTE — PROGRESS NOTES
Subjective     Patient ID: Jennifer Hurst is a 69 y.o. female.    Chief Complaint: Osteoporosis    Patient presents here for annual follow-up of osteoporosis.  Her weight has decreased 8 lb in the last year and her BMI is down to 24.37.  She has been really trying to watch her diet and exercising in order to lose weight.  She is very active and eats a healthy diet.  Her only medical issue at this time is osteoporosis.  Her last bone density in 2021 had improved with exercise and calcium with vitamin-D to the point it was osteopenia in the hip and normal in the spine.  She has remained only on calcium and vitamin-D.  She is due for another DEXA scan at this time.  She has no other complaints at this time and no other medical issues.  As far as health maintenance, she is up-to-date with all of her recommended screening exams and immunizations with the exception of shingles vaccine, RSV vaccine, COVID vaccine, and DEXA scan      Review of Systems   Constitutional:  Negative for chills, fatigue, fever and unexpected weight change.   HENT:  Negative for nasal congestion, postnasal drip and sore throat.    Respiratory:  Negative for cough and shortness of breath.    Cardiovascular:  Negative for chest pain and palpitations.   Gastrointestinal:  Negative for abdominal pain, diarrhea, nausea and vomiting.   Musculoskeletal:  Negative for arthralgias and back pain.   Neurological:  Negative for dizziness, light-headedness and headaches.   Psychiatric/Behavioral:  Negative for sleep disturbance. The patient is not nervous/anxious.           Objective     Physical Exam  Vitals reviewed.   Constitutional:       General: She is not in acute distress.     Appearance: Normal appearance. She is well-developed.   HENT:      Right Ear: Tympanic membrane and external ear normal.      Left Ear: Tympanic membrane and external ear normal.      Mouth/Throat:      Pharynx: Oropharynx is clear. No posterior oropharyngeal erythema.   Neck:       Thyroid: No thyromegaly.   Cardiovascular:      Rate and Rhythm: Normal rate and regular rhythm.      Pulses: Normal pulses.      Heart sounds: Normal heart sounds. No murmur heard.  Pulmonary:      Effort: Pulmonary effort is normal.      Breath sounds: Normal breath sounds. No wheezing or rales.   Musculoskeletal:         General: No tenderness. Normal range of motion.      Right lower leg: No edema.      Left lower leg: No edema.   Neurological:      General: No focal deficit present.      Mental Status: She is alert and oriented to person, place, and time.      Cranial Nerves: No cranial nerve deficit.      Deep Tendon Reflexes: Reflexes are normal and symmetric.            Assessment and Plan     1. Senile osteoporosis    2. Mixed hyperlipidemia  -     Comprehensive Metabolic Panel; Future; Expected date: 06/24/2024  -     Lipid Panel; Future; Expected date: 06/24/2024        1. Continue use of calcium and vitamin-D for treatment of osteoporosis/osteopenia  2. Continue healthy diet and exercise to maintain optimal body weight  3. CMP and lipid profile  4. DEXA scan  5.  Follow-up in 1 year or p.r.n.          Follow up in about 1 year (around 6/24/2025).

## 2024-07-08 ENCOUNTER — PATIENT MESSAGE (OUTPATIENT)
Dept: FAMILY MEDICINE | Facility: CLINIC | Age: 69
End: 2024-07-08
Payer: MEDICARE

## 2025-03-13 ENCOUNTER — TELEPHONE (OUTPATIENT)
Dept: FAMILY MEDICINE | Facility: CLINIC | Age: 70
End: 2025-03-13
Payer: MEDICARE

## 2025-03-13 LAB — CRC RECOMMENDATION EXT: NORMAL

## 2025-03-20 ENCOUNTER — PATIENT OUTREACH (OUTPATIENT)
Dept: ADMINISTRATIVE | Facility: HOSPITAL | Age: 70
End: 2025-03-20
Payer: MEDICARE

## 2025-03-31 DIAGNOSIS — Z12.31 ENCOUNTER FOR SCREENING MAMMOGRAM FOR MALIGNANT NEOPLASM OF BREAST: Primary | ICD-10-CM

## 2025-04-01 ENCOUNTER — HOSPITAL ENCOUNTER (OUTPATIENT)
Dept: RADIOLOGY | Facility: HOSPITAL | Age: 70
Discharge: HOME | End: 2025-04-01
Attending: STUDENT IN AN ORGANIZED HEALTH CARE EDUCATION/TRAINING PROGRAM
Payer: MEDICARE

## 2025-04-01 VITALS — WEIGHT: 141 LBS | BODY MASS INDEX: 24.07 KG/M2 | HEIGHT: 64 IN

## 2025-04-01 DIAGNOSIS — Z12.31 ENCOUNTER FOR SCREENING MAMMOGRAM FOR MALIGNANT NEOPLASM OF BREAST: ICD-10-CM

## 2025-04-01 PROCEDURE — 77067 SCR MAMMO BI INCL CAD: CPT | Mod: 26,,, | Performed by: RADIOLOGY

## 2025-04-01 PROCEDURE — 77063 BREAST TOMOSYNTHESIS BI: CPT | Mod: TC,PO

## 2025-04-01 PROCEDURE — 77063 BREAST TOMOSYNTHESIS BI: CPT | Mod: 26,,, | Performed by: RADIOLOGY

## 2025-06-03 ENCOUNTER — PATIENT MESSAGE (OUTPATIENT)
Dept: ADMINISTRATIVE | Facility: CLINIC | Age: 70
End: 2025-06-03
Payer: MEDICARE